# Patient Record
Sex: FEMALE | Race: WHITE | NOT HISPANIC OR LATINO | Employment: FULL TIME | URBAN - METROPOLITAN AREA
[De-identification: names, ages, dates, MRNs, and addresses within clinical notes are randomized per-mention and may not be internally consistent; named-entity substitution may affect disease eponyms.]

---

## 2023-02-09 PROBLEM — R92.8 ABNORMAL FINDING ON MAMMOGRAPHY: Status: ACTIVE | Noted: 2023-02-09

## 2023-02-09 PROBLEM — L68.0 HIRSUTISM: Status: ACTIVE | Noted: 2023-02-09

## 2023-02-09 PROBLEM — R92.1 CALCIFICATION OF LEFT BREAST: Status: ACTIVE | Noted: 2023-02-09

## 2023-02-09 PROBLEM — R00.2 PALPITATIONS: Status: ACTIVE | Noted: 2023-02-09

## 2023-02-09 PROBLEM — E78.00 HYPERCHOLESTEROLEMIA: Status: ACTIVE | Noted: 2023-02-09

## 2023-02-09 PROBLEM — L98.9 SKIN LESION: Status: ACTIVE | Noted: 2023-02-09

## 2023-02-09 PROBLEM — M54.50 ACUTE LOW BACK PAIN WITHOUT SCIATICA: Status: ACTIVE | Noted: 2023-02-09

## 2023-02-09 PROBLEM — N64.4 PAIN OF LEFT BREAST: Status: ACTIVE | Noted: 2023-02-09

## 2023-02-09 PROBLEM — M54.9 BACK PAIN: Status: ACTIVE | Noted: 2023-02-09

## 2023-02-09 PROBLEM — M76.62 ACHILLES TENDINITIS OF LEFT LOWER EXTREMITY: Status: ACTIVE | Noted: 2023-02-09

## 2023-02-09 PROBLEM — I10 HTN (HYPERTENSION): Status: ACTIVE | Noted: 2023-02-09

## 2023-02-09 PROBLEM — N60.99 ATYPICAL DUCTAL HYPERPLASIA OF BREAST: Status: ACTIVE | Noted: 2023-02-09

## 2023-02-09 PROBLEM — R26.2 DIFFICULTY WALKING DUE TO ANKLE AND FOOT JOINT: Status: ACTIVE | Noted: 2023-02-09

## 2023-02-09 PROBLEM — H93.19 TINNITUS: Status: ACTIVE | Noted: 2023-02-09

## 2023-02-09 PROBLEM — M62.81 MUSCLE WEAKNESS ON EXAMINATION: Status: ACTIVE | Noted: 2023-02-09

## 2023-02-09 PROBLEM — N91.2 AMENORRHEA: Status: ACTIVE | Noted: 2023-02-09

## 2023-02-09 RX ORDER — TRAZODONE HYDROCHLORIDE 50 MG/1
50 TABLET ORAL NIGHTLY
COMMUNITY
End: 2023-03-23

## 2023-02-09 RX ORDER — CHOLECALCIFEROL (VITAMIN D3) 25 MCG
TABLET ORAL
COMMUNITY
Start: 2021-03-09

## 2023-02-09 RX ORDER — LISINOPRIL 20 MG/1
1 TABLET ORAL DAILY
COMMUNITY
Start: 2015-07-30 | End: 2023-03-23 | Stop reason: SDUPTHER

## 2023-03-22 PROBLEM — M76.62 ACHILLES TENDINITIS OF LEFT LOWER EXTREMITY: Status: RESOLVED | Noted: 2023-02-09 | Resolved: 2023-03-22

## 2023-03-23 ENCOUNTER — OFFICE VISIT (OUTPATIENT)
Dept: PRIMARY CARE | Facility: CLINIC | Age: 58
End: 2023-03-23
Payer: COMMERCIAL

## 2023-03-23 ENCOUNTER — LAB (OUTPATIENT)
Dept: LAB | Facility: LAB | Age: 58
End: 2023-03-23
Payer: COMMERCIAL

## 2023-03-23 ENCOUNTER — APPOINTMENT (OUTPATIENT)
Dept: LAB | Facility: LAB | Age: 58
End: 2023-03-23
Payer: COMMERCIAL

## 2023-03-23 VITALS
BODY MASS INDEX: 31.45 KG/M2 | OXYGEN SATURATION: 98 % | DIASTOLIC BLOOD PRESSURE: 79 MMHG | SYSTOLIC BLOOD PRESSURE: 112 MMHG | HEART RATE: 87 BPM | TEMPERATURE: 97.8 F | WEIGHT: 189 LBS

## 2023-03-23 DIAGNOSIS — R73.09 BLOOD SUGAR INCREASED: ICD-10-CM

## 2023-03-23 DIAGNOSIS — I10 HYPERTENSION, UNSPECIFIED TYPE: ICD-10-CM

## 2023-03-23 DIAGNOSIS — Z12.11 SCREEN FOR COLON CANCER: Primary | ICD-10-CM

## 2023-03-23 DIAGNOSIS — Z12.31 ENCOUNTER FOR SCREENING MAMMOGRAM FOR MALIGNANT NEOPLASM OF BREAST: ICD-10-CM

## 2023-03-23 DIAGNOSIS — E78.00 HYPERCHOLESTEROLEMIA: ICD-10-CM

## 2023-03-23 DIAGNOSIS — N60.99 ATYPICAL DUCTAL HYPERPLASIA OF BREAST: ICD-10-CM

## 2023-03-23 DIAGNOSIS — R00.2 PALPITATIONS: ICD-10-CM

## 2023-03-23 PROBLEM — R92.1 CALCIFICATION OF LEFT BREAST: Status: RESOLVED | Noted: 2023-02-09 | Resolved: 2023-03-23

## 2023-03-23 PROBLEM — N64.4 PAIN OF LEFT BREAST: Status: RESOLVED | Noted: 2023-02-09 | Resolved: 2023-03-23

## 2023-03-23 PROBLEM — N91.2 AMENORRHEA: Status: RESOLVED | Noted: 2023-02-09 | Resolved: 2023-03-23

## 2023-03-23 PROBLEM — Z00.00 HEALTH CARE MAINTENANCE: Status: ACTIVE | Noted: 2023-03-23

## 2023-03-23 PROBLEM — R92.8 ABNORMAL FINDING ON MAMMOGRAPHY: Status: RESOLVED | Noted: 2023-02-09 | Resolved: 2023-03-23

## 2023-03-23 PROBLEM — M54.50 ACUTE LOW BACK PAIN WITHOUT SCIATICA: Status: RESOLVED | Noted: 2023-02-09 | Resolved: 2023-03-23

## 2023-03-23 LAB
ESTIMATED AVERAGE GLUCOSE FOR HBA1C: 111 MG/DL
HEMOGLOBIN A1C/HEMOGLOBIN TOTAL IN BLOOD: 5.5 %

## 2023-03-23 PROCEDURE — 83036 HEMOGLOBIN GLYCOSYLATED A1C: CPT

## 2023-03-23 PROCEDURE — 3078F DIAST BP <80 MM HG: CPT | Performed by: INTERNAL MEDICINE

## 2023-03-23 PROCEDURE — 36415 COLL VENOUS BLD VENIPUNCTURE: CPT

## 2023-03-23 PROCEDURE — 3074F SYST BP LT 130 MM HG: CPT | Performed by: INTERNAL MEDICINE

## 2023-03-23 PROCEDURE — 90715 TDAP VACCINE 7 YRS/> IM: CPT | Performed by: INTERNAL MEDICINE

## 2023-03-23 PROCEDURE — 90471 IMMUNIZATION ADMIN: CPT | Performed by: INTERNAL MEDICINE

## 2023-03-23 PROCEDURE — 99214 OFFICE O/P EST MOD 30 MIN: CPT | Performed by: INTERNAL MEDICINE

## 2023-03-23 RX ORDER — LISINOPRIL 20 MG/1
20 TABLET ORAL DAILY
Qty: 90 TABLET | Refills: 2 | Status: SHIPPED | OUTPATIENT
Start: 2023-03-23 | End: 2023-03-23

## 2023-03-23 RX ORDER — CLOBETASOL PROPIONATE 0.5 MG/G
OINTMENT TOPICAL
COMMUNITY
Start: 2022-08-10 | End: 2023-03-23 | Stop reason: WASHOUT

## 2023-03-23 NOTE — PATIENT INSTRUCTIONS
It was a pleasure to meet you today!  Our plans:   - Schedule colonoscopy, mammogram and pap smear.

## 2023-03-23 NOTE — PROGRESS NOTES
Subjective   Onelia Mejía is a 58 y.o. female who presents for Establish Care.  HPI    58F pt of Dr. Garcia here for establishment of care, last seen in November for preventive care visit.     PMHx:  - HTN on lisinopril 20mg, does not check home BP readings.   - Dyslipidemia with strong family history of CAD, history of CAC of 0    - Palpitations resolved   - 2018 fall head trauma concussion entirely recovered   - Atypical ductal hyperplasia recommended high risk breast clinic recommended consideration for hormonal treatment, family history of breast cancer due for followup     Social:   -  is ortho Yariel Mejía at    - 2 children who got  last year  4 total children 1 26 year old and 3 24 year olds all out of the house.   - No tobacco, alcohol or drugs   - [facility]     Lifestyle   - Diet - tries to eat well but not the best eater, enjoys soda however, but does go on binges where she will have up to 3-4 cans a day.  - Exercise - either treadmill or rowing and cardio every day, working back up. History of tear of achilles tendon tear and multiple stressors now getting back into it. Walks the dog 1-2 miles day.     Review as systems as indicated in HPI, otherwise unremarkable.     Objective     /79   Pulse 87   Temp 36.6 °C (97.8 °F)   Wt 85.7 kg (189 lb)   SpO2 98%   BMI 31.45 kg/m²      Physical Exam  General: Appears comfortable, NAD, appropriate affect  HEENT: NCAT, EOMI, pupils symmetric, no conjunctival erythema   Neck: Supple, no LAD   Heart: RRR S1 S2 no murmurs appreciated   Lungs: CTA bilaterally, no rhonchi, rales, or wheezes   Abdomen: Soft, NT/ND, no rebound or guarding, NABS   Extremities: no cyanosis or edema appreciated  Neuro: AAO x 3, answers questions appropriately, no FND, gait unremarkable    Problem List Items Addressed This Visit          Circulatory    HTN (hypertension)     On lisinopril 20mg blood pressures are well controlled.          Relevant Medications     lisinopril 20 mg tablet    Palpitations     Resolved, no current concerns             Other    Atypical ductal hyperplasia of breast    Hypercholesterolemia     CAC 0 in 2021 with significant family history interested in further risk stratification will obtain Lp(a).           Other Visit Diagnoses       Screen for colon cancer    -  Primary    Relevant Orders    Colonoscopy    Encounter for screening mammogram for malignant neoplasm of breast        Relevant Orders    BI mammo bilateral screening tomosynthesis    Blood sugar increased        Relevant Orders    Hemoglobin A1C (Completed)

## 2023-03-23 NOTE — ASSESSMENT & PLAN NOTE
CAC 0 in 2021 with significant family history interested in further risk stratification will obtain Lp(a).

## 2023-03-24 NOTE — ASSESSMENT & PLAN NOTE
Colonoscopy due - patient states that she will schedule this, will attempt with clenpiq   Mammogram  due - she will also schedule with high risk breast clinic  Pap smear due will schedule with GYN   Immunizations deferred for now

## 2023-10-25 ENCOUNTER — PHARMACY VISIT (OUTPATIENT)
Dept: PHARMACY | Facility: CLINIC | Age: 58
End: 2023-10-25
Payer: MEDICARE

## 2023-10-31 ENCOUNTER — PHARMACY VISIT (OUTPATIENT)
Dept: PHARMACY | Facility: CLINIC | Age: 58
End: 2023-10-31
Payer: MEDICARE

## 2023-11-04 ENCOUNTER — PHARMACY VISIT (OUTPATIENT)
Dept: PHARMACY | Facility: CLINIC | Age: 58
End: 2023-11-04
Payer: MEDICARE

## 2023-11-06 ENCOUNTER — PHARMACY VISIT (OUTPATIENT)
Dept: PHARMACY | Facility: CLINIC | Age: 58
End: 2023-11-06
Payer: MEDICARE

## 2023-11-06 PROCEDURE — RXMED WILLOW AMBULATORY MEDICATION CHARGE

## 2023-11-14 ENCOUNTER — OFFICE VISIT (OUTPATIENT)
Dept: PRIMARY CARE | Facility: CLINIC | Age: 58
End: 2023-11-14
Payer: COMMERCIAL

## 2023-11-14 ENCOUNTER — LAB (OUTPATIENT)
Dept: LAB | Facility: LAB | Age: 58
End: 2023-11-14
Payer: COMMERCIAL

## 2023-11-14 VITALS
HEART RATE: 81 BPM | SYSTOLIC BLOOD PRESSURE: 114 MMHG | WEIGHT: 188 LBS | TEMPERATURE: 98.7 F | BODY MASS INDEX: 31.28 KG/M2 | DIASTOLIC BLOOD PRESSURE: 74 MMHG

## 2023-11-14 DIAGNOSIS — E78.00 HYPERCHOLESTEROLEMIA: ICD-10-CM

## 2023-11-14 DIAGNOSIS — N60.99 ATYPICAL DUCTAL HYPERPLASIA OF BREAST: ICD-10-CM

## 2023-11-14 DIAGNOSIS — M79.602 LEFT ARM PAIN: ICD-10-CM

## 2023-11-14 DIAGNOSIS — Z00.00 ENCOUNTER FOR PREVENTATIVE ADULT HEALTH CARE EXAMINATION: ICD-10-CM

## 2023-11-14 DIAGNOSIS — I10 HYPERTENSION, UNSPECIFIED TYPE: ICD-10-CM

## 2023-11-14 DIAGNOSIS — Z00.00 ENCOUNTER FOR PREVENTATIVE ADULT HEALTH CARE EXAMINATION: Primary | ICD-10-CM

## 2023-11-14 DIAGNOSIS — R06.83 SNORING: ICD-10-CM

## 2023-11-14 DIAGNOSIS — Z12.83 SKIN CANCER SCREENING: ICD-10-CM

## 2023-11-14 PROBLEM — M62.81 MUSCLE WEAKNESS ON EXAMINATION: Status: RESOLVED | Noted: 2023-02-09 | Resolved: 2023-11-14

## 2023-11-14 PROCEDURE — 80061 LIPID PANEL: CPT

## 2023-11-14 PROCEDURE — 36415 COLL VENOUS BLD VENIPUNCTURE: CPT

## 2023-11-14 PROCEDURE — 85025 COMPLETE CBC W/AUTO DIFF WBC: CPT

## 2023-11-14 PROCEDURE — 3074F SYST BP LT 130 MM HG: CPT | Performed by: INTERNAL MEDICINE

## 2023-11-14 PROCEDURE — 99396 PREV VISIT EST AGE 40-64: CPT | Performed by: INTERNAL MEDICINE

## 2023-11-14 PROCEDURE — 84443 ASSAY THYROID STIM HORMONE: CPT

## 2023-11-14 PROCEDURE — 80053 COMPREHEN METABOLIC PANEL: CPT

## 2023-11-14 PROCEDURE — 1036F TOBACCO NON-USER: CPT | Performed by: INTERNAL MEDICINE

## 2023-11-14 PROCEDURE — 3078F DIAST BP <80 MM HG: CPT | Performed by: INTERNAL MEDICINE

## 2023-11-14 PROCEDURE — 83036 HEMOGLOBIN GLYCOSYLATED A1C: CPT

## 2023-11-14 PROCEDURE — 82172 ASSAY OF APOLIPOPROTEIN: CPT

## 2023-11-14 NOTE — PROGRESS NOTES
I have scale okay so Subjective   Patient ID: Onelia Mejía is a 58 y.o. female who presents for Annual Exam.  HPI    58-year-old female here for preventive care, last seen for establishment of care in March.    -Left arm pain for the last 2 weeks, throbbing and constant, since COVID vaccination in that arm. Denies swelling, no pain on palpation slightly better on movement. Soreness when laying on it.     PMHx:  - HTN on lisinopril 20mg does not measure home blood pressure readings.   - Dyslipidemia - with strong family history of CAD, history of CAC of 0 in 2021, ordered LP(a)  - Palpitations resolved   - 2018 fall head trauma concussion entirely recovered   - Atypical ductal hyperplasia recommended high risk breast clinic recommended consideration for hormonal treatment, family history of breast cancer due for followup      Social:   -  is ortho [facility]ariel Me[facility] (recent [facility])  - 2 children who got  last year  4 total children 1 26 year old and 3 24 year olds all out of the house.   - No tobacco, alcohol or drugs   - [facility]     Lifestyle   - Diet - fine but  intermittently eats unhealthy food. Does eat a lot of red meat and drinks pepsi.   - Exercise - 15 minutes of cardio a day   - Sleep - overall ok, 7 hours gets up to go the bathroom once a night sometimes twice. Does drink a lot of water before bed.  notes that she started to snore.     Review of systems  General: No constitutional symptoms, some weight loss   HEENT: No headaches, changes in vision or hearing, no sinus or dental issues   Cardiac: No chest pain, palpitations, dyspnea on exertion   Lungs: No cough, wheezing, shortness of breath   Abdomen: No abdominal pain, nausea/vomiting, diarrhea or constipation   : No urinary complaints   Musculoskeletal: As described  Heme: No bleeding or thrombosis issues   Lymph: No swollen lymph glands   Skin: No rashes or lesions   Psych: No reported anxiety or depression      Current  Outpatient Medications   Medication Instructions    cholecalciferol (Vitamin D-3) 25 MCG (1000 UT) tablet oral    lisinopril 20 mg tablet TAKE 1 TABLET BY MOUTH ONCE DAILY    mv-min/iron/folic/calcium/vitK (WOMEN'S MULTIVITAMIN ORAL) oral        Objective     /74   Pulse 81   Temp 37.1 °C (98.7 °F) (Temporal)   Wt 85.3 kg (188 lb)   BMI 31.28 kg/m²     Physical Exam  General: Appears comfortable, NAD, appropriate affect  HEENT: NCAT, EOMI, pupils symmetric, no conjunctival erythema   Neck: Supple, no LAD   Heart: RRR S1 S2 no murmurs appreciated   Lungs: CTA bilaterally, no rhonchi, rales, or wheezes   Abdomen: Soft, NT/ND, no rebound or guarding, NABS   Extremities: no cyanosis or edema appreciated  Musk: Left upper extremity no reproducible pain on palpation, no lymphadenopathy  Neuro: AAO x 3, answers questions appropriately, no FND, gait unremarkable    Assessment/Plan   Problem List Items Addressed This Visit      Adult health examination  Age-appropriate screening performed  Depression screen negative  No additional pertinent family history or toxic habits  Declines STI screening   Cancer screening:  -Colonoscopy ordered not yet obtained  -Mammogram ordered not yet obtained  -Pap smear recommended not yet obtained  -Skin -interested in Derm referral for full skin exam  Immunizations: Shingrix deferred otherwise up-to-date  Dental UTD due for visual exam  Discussed adequate vitamin D intake     Atypical ductal hyperplasia of breast     Encouraged follow-up with breast clinic due for mammogram encouraged to schedule.         Hypercholesterolemia     CAC 0 in 2021 with significant family history interested in further risk stratification will obtain Lp(a).          Relevant Orders    Lipoprotein A (LPA)    HTN (hypertension)     On lisinopril 20mg blood pressures are well controlled.           Other Visit Diagnoses       Encounter for preventative adult health care examination    -  Primary    Relevant  Orders    CBC and Auto Differential    Comprehensive Metabolic Panel    Hemoglobin A1C    Lipid Panel    TSH with reflex to Free T4 if abnormal    Follow Up In [facility] - PCP - Health Maintenance    Snoring        With possible concern for sleep apnea interested in home sleep study has difficulty sleeping inhotel rooms    Relevant Orders    Home sleep apnea test (HSAT)    Skin cancer screening        Relevant Orders    Referral to Dermatology    Left arm pain        No findings appreciated on examination, post-COVID vaccination.  Advise supportive measures notify me if fails to improve or worsen.        Follow-up 1 year or as needed

## 2023-11-14 NOTE — PATIENT INSTRUCTIONS
Onelia,   I have ordered blood and/or urine tests for you to do today. The lab can be found on this floor (2nd floor) next to the pharmacy across from the elevators.    Mammogram - Call [phone] to have this scheduled.   Colonoscopy is ordered - please call [phone] to have it scheduled. Otherwise, someone should be reaching out to you.   Gynecology referral - I recommend Dr. Nimo Hayes ([phone]), Dr. Chichi Jalloh ([phone])  or Dr. Ashtyn Rapp ([phone]).   Take 5922-7388 units of Vitamin D3 daily.   Dermatology Referral - I recommend Dr. Mimi Dominguez, Dr. Darnell Santa, Dr. Curt Alejo and Dr. Jj Pina.  You can call [phone] to have this scheduled.   Follow up breast surgery.  Get your second shingrix vaccine.   Home sleep study has been ordered.   Followup 1 year or as needed.

## 2023-11-15 LAB
ALBUMIN SERPL BCP-MCNC: 4.4 G/DL (ref 3.4–5)
ALP SERPL-CCNC: 101 U/L (ref 33–110)
ALT SERPL W P-5'-P-CCNC: 26 U/L (ref 7–45)
ANION GAP SERPL CALC-SCNC: 14 MMOL/L (ref 10–20)
AST SERPL W P-5'-P-CCNC: 20 U/L (ref 9–39)
BASOPHILS # BLD AUTO: 0.03 X10*3/UL (ref 0–0.1)
BASOPHILS NFR BLD AUTO: 0.5 %
BILIRUB SERPL-MCNC: 0.9 MG/DL (ref 0–1.2)
BUN SERPL-MCNC: 10 MG/DL (ref 6–23)
CALCIUM SERPL-MCNC: 9.5 MG/DL (ref 8.6–10.6)
CHLORIDE SERPL-SCNC: 104 MMOL/L (ref 98–107)
CHOLEST SERPL-MCNC: 209 MG/DL (ref 0–199)
CHOLESTEROL/HDL RATIO: 4
CO2 SERPL-SCNC: 28 MMOL/L (ref 21–32)
CREAT SERPL-MCNC: 0.72 MG/DL (ref 0.5–1.05)
EOSINOPHIL # BLD AUTO: 0.06 X10*3/UL (ref 0–0.7)
EOSINOPHIL NFR BLD AUTO: 0.9 %
ERYTHROCYTE [DISTWIDTH] IN BLOOD BY AUTOMATED COUNT: 13.4 % (ref 11.5–14.5)
EST. AVERAGE GLUCOSE BLD GHB EST-MCNC: 108 MG/DL
GFR SERPL CREATININE-BSD FRML MDRD: >90 ML/MIN/1.73M*2
GLUCOSE SERPL-MCNC: 77 MG/DL (ref 74–99)
HBA1C MFR BLD: 5.4 %
HCT VFR BLD AUTO: 44 % (ref 36–46)
HDLC SERPL-MCNC: 52.7 MG/DL
HGB BLD-MCNC: 14.2 G/DL (ref 12–16)
IMM GRANULOCYTES # BLD AUTO: 0.01 X10*3/UL (ref 0–0.7)
IMM GRANULOCYTES NFR BLD AUTO: 0.2 % (ref 0–0.9)
LDLC SERPL CALC-MCNC: 137 MG/DL
LYMPHOCYTES # BLD AUTO: 1.83 X10*3/UL (ref 1.2–4.8)
LYMPHOCYTES NFR BLD AUTO: 28.4 %
MCH RBC QN AUTO: 29.1 PG (ref 26–34)
MCHC RBC AUTO-ENTMCNC: 32.3 G/DL (ref 32–36)
MCV RBC AUTO: 90 FL (ref 80–100)
MONOCYTES # BLD AUTO: 0.38 X10*3/UL (ref 0.1–1)
MONOCYTES NFR BLD AUTO: 5.9 %
NEUTROPHILS # BLD AUTO: 4.14 X10*3/UL (ref 1.2–7.7)
NEUTROPHILS NFR BLD AUTO: 64.1 %
NON HDL CHOLESTEROL: 156 MG/DL (ref 0–149)
NRBC BLD-RTO: 0 /100 WBCS (ref 0–0)
PLATELET # BLD AUTO: 471 X10*3/UL (ref 150–450)
POTASSIUM SERPL-SCNC: 4.7 MMOL/L (ref 3.5–5.3)
PROT SERPL-MCNC: 7.2 G/DL (ref 6.4–8.2)
RBC # BLD AUTO: 4.88 X10*6/UL (ref 4–5.2)
SODIUM SERPL-SCNC: 141 MMOL/L (ref 136–145)
TRIGL SERPL-MCNC: 99 MG/DL (ref 0–149)
TSH SERPL-ACNC: 1.23 MIU/L (ref 0.44–3.98)
VLDL: 20 MG/DL (ref 0–40)
WBC # BLD AUTO: 6.5 X10*3/UL (ref 4.4–11.3)

## 2023-11-17 LAB — LPA SERPL-MCNC: 102 MG/DL

## 2023-11-29 DIAGNOSIS — I10 HYPERTENSION, UNSPECIFIED TYPE: ICD-10-CM

## 2023-11-29 RX ORDER — LISINOPRIL 20 MG/1
20 TABLET ORAL DAILY
Qty: 90 TABLET | Refills: 3 | Status: SHIPPED | OUTPATIENT
Start: 2023-11-29 | End: 2024-11-28

## 2024-02-05 PROCEDURE — RXMED WILLOW AMBULATORY MEDICATION CHARGE

## 2024-02-06 ENCOUNTER — PHARMACY VISIT (OUTPATIENT)
Dept: PHARMACY | Facility: CLINIC | Age: 59
End: 2024-02-06
Payer: MEDICARE

## 2024-02-21 DIAGNOSIS — Z12.11 COLON CANCER SCREENING: ICD-10-CM

## 2024-02-21 RX ORDER — SOD SULF/POT CHLORIDE/MAG SULF 1.479 G
TABLET ORAL
Qty: 24 TABLET | Refills: 0 | Status: SHIPPED | OUTPATIENT
Start: 2024-02-21

## 2024-02-22 ENCOUNTER — HOSPITAL ENCOUNTER (OUTPATIENT)
Dept: RADIOLOGY | Facility: CLINIC | Age: 59
Discharge: HOME | End: 2024-02-22
Payer: COMMERCIAL

## 2024-02-22 VITALS — HEIGHT: 65 IN | WEIGHT: 188.05 LBS | BODY MASS INDEX: 31.33 KG/M2

## 2024-02-22 DIAGNOSIS — Z12.31 ENCOUNTER FOR SCREENING MAMMOGRAM FOR MALIGNANT NEOPLASM OF BREAST: ICD-10-CM

## 2024-02-22 PROBLEM — N60.99 ATYPICAL DUCTAL HYPERPLASIA, BREAST: Status: ACTIVE | Noted: 2024-02-22

## 2024-02-22 PROCEDURE — 77067 SCR MAMMO BI INCL CAD: CPT | Performed by: RADIOLOGY

## 2024-02-22 PROCEDURE — 77067 SCR MAMMO BI INCL CAD: CPT

## 2024-02-22 PROCEDURE — 77063 BREAST TOMOSYNTHESIS BI: CPT | Performed by: RADIOLOGY

## 2024-03-13 ENCOUNTER — TELEPHONE (OUTPATIENT)
Dept: GASTROENTEROLOGY | Facility: CLINIC | Age: 59
End: 2024-03-13
Payer: COMMERCIAL

## 2024-03-13 DIAGNOSIS — Z12.11 COLON CANCER SCREENING: Primary | ICD-10-CM

## 2024-03-13 RX ORDER — SODIUM PICOSULFATE, MAGNESIUM OXIDE, AND ANHYDROUS CITRIC ACID 12; 3.5; 1 G/175ML; G/175ML; MG/175ML
LIQUID ORAL
Qty: 175 ML | Refills: 0 | Status: SHIPPED | OUTPATIENT
Start: 2024-03-13

## 2024-04-29 ENCOUNTER — OFFICE VISIT (OUTPATIENT)
Dept: GASTROENTEROLOGY | Facility: EXTERNAL LOCATION | Age: 59
End: 2024-04-29
Payer: COMMERCIAL

## 2024-04-29 DIAGNOSIS — D12.4 BENIGN NEOPLASM OF DESCENDING COLON: ICD-10-CM

## 2024-04-29 DIAGNOSIS — Z12.11 SCREEN FOR COLON CANCER: ICD-10-CM

## 2024-04-29 DIAGNOSIS — Z80.0 FAMILY HISTORY OF COLON CANCER: Primary | ICD-10-CM

## 2024-04-29 DIAGNOSIS — K64.4 EXTERNAL HEMORRHOIDS: ICD-10-CM

## 2024-04-29 PROCEDURE — 88305 TISSUE EXAM BY PATHOLOGIST: CPT

## 2024-04-29 PROCEDURE — 45385 COLONOSCOPY W/LESION REMOVAL: CPT | Performed by: INTERNAL MEDICINE

## 2024-04-29 PROCEDURE — 88305 TISSUE EXAM BY PATHOLOGIST: CPT | Performed by: PATHOLOGY

## 2024-04-29 NOTE — PROGRESS NOTES
Colonoscopy performed today 4/29/2024 at the [facility] ([facility] [place]  See procedure report(s) under Media tab.

## 2024-04-30 ENCOUNTER — LAB REQUISITION (OUTPATIENT)
Dept: LAB | Facility: HOSPITAL | Age: 59
End: 2024-04-30
Payer: COMMERCIAL

## 2024-04-30 PROCEDURE — RXMED WILLOW AMBULATORY MEDICATION CHARGE

## 2024-05-03 ENCOUNTER — PHARMACY VISIT (OUTPATIENT)
Dept: PHARMACY | Facility: CLINIC | Age: 59
End: 2024-05-03
Payer: MEDICARE

## 2024-05-03 LAB
LABORATORY COMMENT REPORT: NORMAL
PATH REPORT.FINAL DX SPEC: NORMAL
PATH REPORT.GROSS SPEC: NORMAL
PATH REPORT.RELEVANT HX SPEC: NORMAL
PATH REPORT.TOTAL CANCER: NORMAL

## 2024-06-14 ENCOUNTER — APPOINTMENT (OUTPATIENT)
Dept: OBSTETRICS AND GYNECOLOGY | Facility: CLINIC | Age: 59
End: 2024-06-14
Payer: COMMERCIAL

## 2024-07-25 ENCOUNTER — LAB (OUTPATIENT)
Dept: LAB | Facility: LAB | Age: 59
End: 2024-07-25
Payer: COMMERCIAL

## 2024-07-25 ENCOUNTER — APPOINTMENT (OUTPATIENT)
Dept: OBSTETRICS AND GYNECOLOGY | Facility: CLINIC | Age: 59
End: 2024-07-25
Payer: COMMERCIAL

## 2024-07-25 ENCOUNTER — OFFICE VISIT (OUTPATIENT)
Dept: OBSTETRICS AND GYNECOLOGY | Facility: CLINIC | Age: 59
End: 2024-07-25
Payer: COMMERCIAL

## 2024-07-25 VITALS
DIASTOLIC BLOOD PRESSURE: 88 MMHG | SYSTOLIC BLOOD PRESSURE: 132 MMHG | WEIGHT: 188 LBS | HEIGHT: 65 IN | BODY MASS INDEX: 31.32 KG/M2

## 2024-07-25 DIAGNOSIS — N91.2 AMENORRHEA: Primary | ICD-10-CM

## 2024-07-25 DIAGNOSIS — N91.2 AMENORRHEA: ICD-10-CM

## 2024-07-25 DIAGNOSIS — Z01.419 ENCOUNTER FOR GYNECOLOGICAL EXAMINATION WITHOUT ABNORMAL FINDING: ICD-10-CM

## 2024-07-25 LAB
FSH SERPL-ACNC: 80.5 IU/L
LH SERPL-ACNC: 46.6 IU/L

## 2024-07-25 PROCEDURE — 88175 CYTOPATH C/V AUTO FLUID REDO: CPT

## 2024-07-25 PROCEDURE — 87624 HPV HI-RISK TYP POOLED RSLT: CPT

## 2024-07-25 PROCEDURE — 36415 COLL VENOUS BLD VENIPUNCTURE: CPT

## 2024-07-25 PROCEDURE — 83001 ASSAY OF GONADOTROPIN (FSH): CPT

## 2024-07-25 PROCEDURE — 83002 ASSAY OF GONADOTROPIN (LH): CPT

## 2024-07-25 NOTE — PROGRESS NOTES
Subjective   Onelia Mejía is a 59 y.o. female here for a routine exam.  She is a new patient to this practice.  She denies abnormal Pap smears in the past, the last Pap in the chart was 2006 with Dr. Dubose.  It was negative.  No history of STDs.  No surgery.    She has a brother with colon cancer.    She has a Mirena IUD that was placed over 8 years ago.  She is amenorrheic.  No discharge or odor, no pelvic pain.  No dysuria or change in bowel habits.  She is current on her colonoscopy from May 2024.    She does note menopausal symptoms but they are becoming more mild.  Personal health questionnaire reviewed: yes.     Gynecologic History  No LMP recorded. Patient has had an implant.  Contraception: IUD  Last Pap: 2006. Results were: normal  Last mammogram: 25. Results were: normal    Obstetric History  OB History    Para Term  AB Living   4 4 1 3 0 4   SAB IAB Ectopic Multiple Live Births   0 0 0 3 4      # Outcome Date GA Lbr Kimo/2nd Weight Sex Type Anes PTL Lv   4             3             2             1 Term                Objective   Constitutional: Alert and in no acute distress. Well developed, well nourished.   Head and Face: Head and face: Normal.    Eyes: Normal external exam - nonicteric sclera, extraocular movements intact (EOMI) and no ptosis.   Neck: No neck asymmetry. Supple. Thyroid not enlarged and there were no palpable thyroid nodules.    Pulmonary: No respiratory distress.   Chest: Breasts: Normal appearance, no nipple discharge and no skin changes. Palpation of breasts and axillae: No palpable mass and no axillary lymphadenopathy.   Abdomen: Soft nontender; no abdominal mass palpated. No organomegaly. No hernias.   Genitourinary: External genitalia: Normal. No inguinal lymphadenopathy. Bartholin's Urethral and Skenes Glands: Normal. Urethra: Normal.  Bladder: Normal on palpation. Vagina: Normal. Cervix: Normal. IUD strings are visible.   Uterus: Normal.  Right Adnexa/parametria: Normal.  Left Adnexa/parametria: Normal.  Inspection of Perianal Area: Normal.   Musculoskeletal: No joint swelling seen, normal movements of all extremities.   Skin: Normal skin color and pigmentation, normal skin turgor, and no rash.   Neurologic: Non-focal. Grossly intact.   Psychiatric: Alert and oriented x 3. Affect normal to patient baseline. Mood: Appropriate.  Physical Exam     Assessment/Plan   Healthy female exam.  This is a 59-year-old female with a normal exam.  A Pap smear was sent.    Her routine mammogram was ordered with tomosynthesis, it is due in 2025.    We discussed the  Mirena IUD.  I recommend checking an FSH which I suspect will be in the menopausal range.  She will then return for removal of Mirena IUD.  I will see her soon.  Mammogram ordered.

## 2024-07-29 PROCEDURE — RXMED WILLOW AMBULATORY MEDICATION CHARGE

## 2024-07-30 ENCOUNTER — PHARMACY VISIT (OUTPATIENT)
Dept: PHARMACY | Facility: CLINIC | Age: 59
End: 2024-07-30
Payer: MEDICARE

## 2024-08-02 LAB
CYTOLOGY CMNT CVX/VAG CYTO-IMP: NORMAL
HPV HR 12 DNA GENITAL QL NAA+PROBE: NEGATIVE
HPV HR GENOTYPES PNL CVX NAA+PROBE: NEGATIVE
HPV16 DNA SPEC QL NAA+PROBE: NEGATIVE
HPV18 DNA SPEC QL NAA+PROBE: NEGATIVE
LAB AP CONTRACEPTIVE HISTORY: NORMAL
LAB AP HPV GENOTYPE QUESTION: YES
LAB AP HPV HR: NORMAL
LABORATORY COMMENT REPORT: NORMAL
PATH REPORT.TOTAL CANCER: NORMAL

## 2024-09-04 ASSESSMENT — DERMATOLOGY QUALITY OF LIFE (QOL) ASSESSMENT
RATE HOW BOTHERED YOU ARE BY EFFECTS OF YOUR SKIN PROBLEMS ON YOUR ACTIVITIES (EG, GOING OUT, ACCOMPLISHING WHAT YOU WANT, WORK ACTIVITIES OR YOUR RELATIONSHIPS WITH OTHERS): 0 - NEVER BOTHERED
RATE HOW EMOTIONALLY BOTHERED YOU ARE BY YOUR SKIN PROBLEM (FOR EXAMPLE, WORRY, EMBARRASSMENT, FRUSTRATION): 0 - NEVER BOTHERED
RATE HOW BOTHERED YOU ARE BY EFFECTS OF YOUR SKIN PROBLEMS ON YOUR ACTIVITIES (EG, GOING OUT, ACCOMPLISHING WHAT YOU WANT, WORK ACTIVITIES OR YOUR RELATIONSHIPS WITH OTHERS): 0 - NEVER BOTHERED
RATE HOW EMOTIONALLY BOTHERED YOU ARE BY YOUR SKIN PROBLEM (FOR EXAMPLE, WORRY, EMBARRASSMENT, FRUSTRATION): 0 - NEVER BOTHERED
RATE HOW BOTHERED YOU ARE BY SYMPTOMS OF YOUR SKIN PROBLEM (EG, ITCHING, STINGING BURNING, HURTING OR SKIN IRRITATION): 0 - NEVER BOTHERED
WHAT SINGLE SKIN CONDITION LISTED BELOW IS THE PATIENT ANSWERING THE QUALITY-OF-LIFE ASSESSMENT QUESTIONS ABOUT: NONE OF THE ABOVE
WHAT SINGLE SKIN CONDITION LISTED BELOW IS THE PATIENT ANSWERING THE QUALITY-OF-LIFE ASSESSMENT QUESTIONS ABOUT: NONE OF THE ABOVE
RATE HOW BOTHERED YOU ARE BY SYMPTOMS OF YOUR SKIN PROBLEM (EG, ITCHING, STINGING BURNING, HURTING OR SKIN IRRITATION): 0 - NEVER BOTHERED

## 2024-09-04 ASSESSMENT — PATIENT GLOBAL ASSESSMENT (PGA): WHAT IS THE PGA: PATIENT GLOBAL ASSESSMENT:  1 - CLEAR

## 2024-09-05 ENCOUNTER — APPOINTMENT (OUTPATIENT)
Dept: DERMATOLOGY | Facility: CLINIC | Age: 59
End: 2024-09-05
Payer: COMMERCIAL

## 2024-09-05 DIAGNOSIS — L57.8 ACTINIC SKIN DAMAGE: ICD-10-CM

## 2024-09-05 DIAGNOSIS — Z12.83 SCREENING EXAM FOR SKIN CANCER: ICD-10-CM

## 2024-09-05 DIAGNOSIS — D18.01 HEMANGIOMA OF SKIN: ICD-10-CM

## 2024-09-05 DIAGNOSIS — D48.5 NEOPLASM OF UNCERTAIN BEHAVIOR OF SKIN: ICD-10-CM

## 2024-09-05 DIAGNOSIS — L82.1 SEBORRHEIC KERATOSIS: ICD-10-CM

## 2024-09-05 DIAGNOSIS — L81.4 LENTIGO: ICD-10-CM

## 2024-09-05 DIAGNOSIS — L57.0 ACTINIC KERATOSIS: ICD-10-CM

## 2024-09-05 DIAGNOSIS — D22.9 MULTIPLE BENIGN NEVI: Primary | ICD-10-CM

## 2024-09-05 PROCEDURE — 17000 DESTRUCT PREMALG LESION: CPT | Performed by: DERMATOLOGY

## 2024-09-05 PROCEDURE — 1036F TOBACCO NON-USER: CPT | Performed by: DERMATOLOGY

## 2024-09-05 PROCEDURE — 11302 SHAVE SKIN LESION 1.1-2.0 CM: CPT | Performed by: DERMATOLOGY

## 2024-09-05 PROCEDURE — 99203 OFFICE O/P NEW LOW 30 MIN: CPT | Performed by: DERMATOLOGY

## 2024-09-05 ASSESSMENT — DERMATOLOGY PATIENT ASSESSMENT
DO YOU USE A TANNING BED: NO
ARE YOU ON BIRTH CONTROL: NO
ARE YOU AN ORGAN TRANSPLANT RECIPIENT: NO
DO YOU HAVE ANY NEW OR CHANGING LESIONS: NO
HAVE YOU HAD OR DO YOU HAVE VASCULAR DISEASE: NO
DO YOU USE SUNSCREEN: OCCASIONALLY
ARE YOU TRYING TO GET PREGNANT: NO
DO YOU HAVE IRREGULAR MENSTRUAL CYCLES: NO

## 2024-09-05 ASSESSMENT — ITCH NUMERIC RATING SCALE: HOW SEVERE IS YOUR ITCHING?: 0

## 2024-09-05 ASSESSMENT — DERMATOLOGY QUALITY OF LIFE (QOL) ASSESSMENT
RATE HOW EMOTIONALLY BOTHERED YOU ARE BY YOUR SKIN PROBLEM (FOR EXAMPLE, WORRY, EMBARRASSMENT, FRUSTRATION): 0 - NEVER BOTHERED
DATE THE QUALITY-OF-LIFE ASSESSMENT WAS COMPLETED: 67088
RATE HOW BOTHERED YOU ARE BY EFFECTS OF YOUR SKIN PROBLEMS ON YOUR ACTIVITIES (EG, GOING OUT, ACCOMPLISHING WHAT YOU WANT, WORK ACTIVITIES OR YOUR RELATIONSHIPS WITH OTHERS): 0 - NEVER BOTHERED
RATE HOW BOTHERED YOU ARE BY SYMPTOMS OF YOUR SKIN PROBLEM (EG, ITCHING, STINGING BURNING, HURTING OR SKIN IRRITATION): 0 - NEVER BOTHERED

## 2024-09-05 ASSESSMENT — PATIENT GLOBAL ASSESSMENT (PGA): PATIENT GLOBAL ASSESSMENT: PATIENT GLOBAL ASSESSMENT:  1 - CLEAR

## 2024-09-05 NOTE — PROGRESS NOTES
Subjective     Onelia Mejía is a 59 y.o. female who presents for the following: Skin Check (H/o Rosacea. No personal or family h/o skin cancers.). Last derm visit 8/10/22 with Dr. Santa for athropod bites, it was a telephone visit. She has also seen Dr. Carol Tejada for rosacea.     Relevant Full Skin Exam History    Prior Full Skin Exam with a dermatologist: >10 years ago with outside derm   Previous hx of melanoma:   no   Hx of abnormal (dysplastic) moles:    no   Hx of sunburn:    yes   Family hx of Melanoma:   no   Immunosuppressive condition: no        Review of Systems:  No other skin or systemic complaints other than what is documented elsewhere in the note.    The following portions of the chart were reviewed this encounter and updated as appropriate:  Tobacco  Allergies  Meds  Problems  Med Hx  Surg Hx         Skin Cancer History  No skin cancer on file.      Specialty Problems          Dermatology Problems    Hirsutism    Skin lesion        Objective   Well appearing patient in no apparent distress; mood and affect are within normal limits.    A full examination was performed including scalp, head, eyes, ears, nose, lips, neck, chest, axillae, abdomen, back, buttocks, bilateral upper extremities, bilateral lower extremities, hands, feet, fingers, toes, fingernails, and toenails. All findings within normal limits unless otherwise noted below.    Assessment/Plan   1. Multiple benign nevi  Brown and tan macules and papules with reassuring findings on dermoscopy    -These lesions have benign, reassuring patterns on dermoscopy  -Recommend continued self observation, and to contact the office if any changes in nevi are noticed    2. Lentigo  Tan macules    -Benign appearing on exam  -Reassurance, recommend observation    3. Seborrheic keratosis  Stuck on, waxy macule(s)/papule(s)/plaque(s) with comedo-like openings and milia like cysts    -Discussed the nature of the diagnosis  -Reassurance,  recommend continued observation    4. Hemangioma of skin  Cherry red papules    -Discussed the nature of the diagnosis  -Reassurance, recommend continued observation    5. Actinic skin damage  Background of photodamage with hyper- and hypo-pigmented macules on the skin    6. Screening exam for skin cancer  As part of a routine Full Skin Exam, a genital examination with the presence of a chaperone was offered. The patient declinedd the exam and declined the chaperone.       Full body skin exam  -No lesions concerning for malignancy on the remainder the skin exam today   - The ugly duckling sign was discussed. Monitor for any skin lesions that are different in color, shape, or size than others on body  -Sun protection was discussed. Recommend SPF 30+, hats with brims, sun protective clothing, and avoiding sun exposure between 10 AM and 2 PM whenever possible  -Recommend regular skin exams or sooner if new or changing lesions       Related Procedures  Follow Up In Dermatology - Established Patient    7. Actinic keratosis  Right Tip of Nose  Erythematous scaly macule(s)    -Discussed nature of diagnosis and treatment options.   -Patient wishes to proceed with Cryotherapy today  -Possible side effects of liquid nitrogen treatment reviewed including formation of blisters, crusting, tenderness, scar, and discoloration which may be permanent.  -Patient advised to return the office for re-evaluation if the treated lesion(s) do not resolve within 4-6 weeks. Patient verbalizes understanding.    Destr of lesion - Right Tip of Nose  Complexity: simple    Destruction method: cryotherapy    Informed consent: discussed and consent obtained    Lesion destroyed using liquid nitrogen: Yes    Outcome: patient tolerated procedure well with no complications    Post-procedure details: wound care instructions given      8. Neoplasm of uncertain behavior of skin  Left Shoulder - Posterior  13x8 mm brown irregular patch with 2 different  shades of brown and irregular broken pigment pattern              Shave removal    Lesion length (cm):  0.8  Lesion width (cm):  1.3  Margin per side (cm):  0.2  Lesion diameter (cm):  1.7  Informed consent: discussed and consent obtained    Timeout: patient name, date of birth, surgical site, and procedure verified    Procedure prep:  Patient was prepped and draped  Anesthesia: the lesion was anesthetized in a standard fashion    Anesthetic:  1% lidocaine w/ epinephrine 1-100,000 local infiltration  Instrument used: DermaBlade    Hemostasis achieved with: aluminum chloride    Outcome: patient tolerated procedure well    Post-procedure details: sterile dressing applied and wound care instructions given    Dressing type: bandage and petrolatum      Staff Communication: Dermatology Local Anesthesia: Lidocaine 0.5% with Epinephrine 1;200,000 - Amount: 3 ml    Specimen 1 - Dermatopathology- DERM LAB  Differential Diagnosis: dysplastic nevus vs melanoma vs lentigo  Check Margins Yes/No?:    Comments:    Dermpath Lab: Routine Histopathology (formalin-fixed tissue)

## 2024-09-05 NOTE — PATIENT INSTRUCTIONS
Actinic Keratosis - right nasal tip    What is an actinic keratosis?  An actinic keratosis (plural: actinic keratoses) is growth on the surface of the skin that usually appears as a red, hard, crusty or scaly bump.     What causes actinic keratoses?  Repeated prolonged sun exposure causes skin damage, especially in fair-skinned persons. Sun-damaged skin becomes dry and wrinkled and may form rough, scaly spots called actinic keratoses. These rough spots remain on the skin even though the crust or scale on top is picked off.     Why treat actinic keratoses?  Actinic keratoses are not skin cancers, but because they may sometimes turn cancerous they are called “pre-cancerous”. Not all will turn to skin cancer, and it usually takes several years for this to happen. Because it is much easier to treat an actinic keratosis then it is to remove a skin cancer, actinic keratoses should be treated to prevent future skin cancer.     How are actinic keratoses treated?  The most common way of treating actinic keratoses is to freeze them with liquid nitrogen. Freezing causes scabbing and shedding of the sun-damaged skin. Healing after a removal usually takes two weeks, depending on the size and location of the keratosis. Hands and legs heal more slowly than the face. The skin’s final appearance is usually excellent. There are several topical medications that can be used to treat actinic keratoses. These medications generally have side effects of redness, crusting, and pain. Some are used for a few days, and some for several months before the actinic keratosis is completely gone. Photodynamic therapy is another alternative to freezing actinic keratoses. This treatment is done in a physician’s office. A medication is applied to the area of skin with actinic keratoses, and it is allowed to soak in for one or more hours. A special light is then applied to the skin. Side effects include redness, burning, and peeling.    How can you  prevent actinic keratoses?  Protection from the sun is the best way to prevent actinic keratoses. The use of proper clothing and sunscreens can prevent the sun damage that leads to an actinic keratosis.  Unfortunately, some sun damage is permanent. Once sun damage has progressed to the point where actinic keratoses develop, new keratoses may appear even without further sun exposure. However, even in skin that is already heavily sun damaged, good sun protection can help reduce the number of actinic keratoses that will appear.

## 2024-09-11 LAB
LAB AP ASR DISCLAIMER: NORMAL
LABORATORY COMMENT REPORT: NORMAL
PATH REPORT.FINAL DX SPEC: NORMAL
PATH REPORT.GROSS SPEC: NORMAL
PATH REPORT.RELEVANT HX SPEC: NORMAL
PATH REPORT.TOTAL CANCER: NORMAL
PATHOLOGY SYNOPTIC REPORT: NORMAL

## 2024-09-12 ENCOUNTER — TELEPHONE (OUTPATIENT)
Dept: DERMATOLOGY | Facility: CLINIC | Age: 59
End: 2024-09-12
Payer: COMMERCIAL

## 2024-09-13 DIAGNOSIS — C43.9 MELANOMA OF SKIN (MULTI): Primary | ICD-10-CM

## 2024-09-13 NOTE — TUMOR BOARD NOTE
General Patient Information  Name:  Onelia Mejía  Evaluation #:  1  Conference Date:  9/16/2024  YOB: 1965  MRN:  [id number]  Program Physician(s):  Shorty Colbert  Referring Physician(s):  Hiren Bear      Summary   Stage:  Ngozi ([id number]) ; Melanoma 5 year survival: 99%    Assessment:  Left posterior shoulder with a non-ulcerated superficial spreading melanoma, Breslow 0.2 mm    Recommendation:  WLE with 1 cm margins.    Review Multidisciplinary Cutaneous Oncology Conference recommendation with patient.  Continue routine follow up and total body skin exams with Mimi Dominguez.    Follow Up:  Hiren Bear      History and Physical Exam  Dermatologic History:   59 y.o. female with a biopsy of the left posterior shoulder on 9/5/2024 showing a non-ulcerated melanoma, superficial spreading type, Breslow: 0.2 mm.    Patient is scheduled with Dr. Cavazos on 10/14/2024    Past Medical History:    Past Medical History:   Diagnosis Date    Atypical ductal hyperplasia, breast     Hypertension     Other conditions influencing health status     No significant past medical history    Scoliosis        Family History of DNS/MM:  Unknown    Skin:  13x8 mm brown irregular patch with 2 different shades of brown and irregular broken pigment pattern     Lymphatic:        Pathology  Dermatopathology- DERM LAB: [id number]  Collected 9/5/2024 13:29      SKIN, LEFT SHOULDER - POSTERIOR, SHAVE EXCISION:  MALIGNANT MELANOMA, BRESLOW THICKNESS 0.2 MM, PRESENT ON THE DEEP AND PERIPHERAL MARGIN, SEE NOTE.     Note: Microscopic examination reveals a specimen that extends into the superficial dermis. There is an asymmetric proliferation of nested and single atypical melanocytes throughout all layers of the epidermis. There are occasional nests of melanocytes in the dermis. The melanocytes stain with antibodies against PRAME and SOX-10. All control slides stain appropriately. The melanocytes have  mildly enlarged nuclei with mild cytoplasm.     ** Electronically signed out by Vishnu Quispe MD **  SPECIMEN   Procedure  Biopsy, shave   Specimen Laterality  Left   TUMOR   Tumor Site  Skin of upper limb and shoulder: Left shoulder - posterior        Histologic Type  Superficial spreading melanoma (low-cumulative sun damage (CSD) melanoma)   Maximum Tumor (Breslow) Thickness (Millimeters)  0.2 mm   Ulceration  Not identified   Anatomic (Jerrod) Level  II (melanoma present in but does not fill and expand papillary dermis)   Mitotic Rate  None identified   Microsatellite(s)  Not identified   Lymphovascular Invasion  Not identified   Neurotropism  Not identified   Tumor-Infiltrating Lymphocytes  Not identified   Tumor Regression  Not identified   MARGINS     Margin Status for Invasive Melanoma  Invasive melanoma present at margin   Margin(s) Involved by Invasive Melanoma  Peripheral     Deep   Margin Status for Melanoma in situ  All margins negative for melanoma in situ   PATHOLOGIC STAGE CLASSIFICATION (pTNM, AJCC 8th Edition)     pT Category  pT1a       Radiology        Clinical Images  9/5/2024

## 2024-09-16 ENCOUNTER — TUMOR BOARD CONFERENCE (OUTPATIENT)
Dept: HEMATOLOGY/ONCOLOGY | Facility: HOSPITAL | Age: 59
End: 2024-09-16
Payer: COMMERCIAL

## 2024-10-07 ENCOUNTER — APPOINTMENT (OUTPATIENT)
Dept: OBSTETRICS AND GYNECOLOGY | Facility: CLINIC | Age: 59
End: 2024-10-07
Payer: COMMERCIAL

## 2024-10-07 VITALS — SYSTOLIC BLOOD PRESSURE: 125 MMHG | WEIGHT: 186 LBS | DIASTOLIC BLOOD PRESSURE: 84 MMHG | BODY MASS INDEX: 30.95 KG/M2

## 2024-10-07 DIAGNOSIS — Z30.432 ENCOUNTER FOR REMOVAL OF INTRAUTERINE CONTRACEPTIVE DEVICE (IUD): Primary | ICD-10-CM

## 2024-10-07 PROCEDURE — 58301 REMOVE INTRAUTERINE DEVICE: CPT | Performed by: OBSTETRICS & GYNECOLOGY

## 2024-10-07 NOTE — PROGRESS NOTES
Patient ID: Onelia Mejía is a 59 y.o. female here for removal of  Mirena IUD.    IUD Removal    Performed by: Nimo Hayes MD  Authorized by: Nimo Hayes MD    Procedure: IUD removal    Consent obtained by patient, parent, or legal power of  - including discussion of procedure risks and benefits, patient questions answered, and patient education provided: yes    Reason for removal: patient request    Strings visualized: yes    Tenaculum applied to cervix: no    IUD grasped by forceps: yes    Performed with ultrasound guidance: no    IUD removed: yes    Date/Time of Removal:  10/7/2024 3:46 PM  Removed without complications: yes    IUD intact: yes    Removal comments: 59-year-old female with amenorrhea, her Mirena IUD was inserted over 8 years ago.  An FSH recently was 80.5.  Her Pap in 2024 was negative, high risk HPV negative.    The strings were visible and the IUD was removed in the typical fashion.  It was intact and this was verified with the patient.  We discussed that she is likely menopausal, but she may want to use condoms until 1 year of amenorrhea without the IUD.  I will see her in 1 year for annual exam.  Cervix manually dilated: no

## 2024-10-14 ENCOUNTER — APPOINTMENT (OUTPATIENT)
Dept: DERMATOLOGY | Facility: CLINIC | Age: 59
End: 2024-10-14
Payer: COMMERCIAL

## 2024-10-14 DIAGNOSIS — C43.62 MELANOMA OF SHOULDER, LEFT (MULTI): ICD-10-CM

## 2024-10-14 PROCEDURE — 12032 INTMD RPR S/A/T/EXT 2.6-7.5: CPT | Performed by: DERMATOLOGY

## 2024-10-14 PROCEDURE — 11604 EXC TR-EXT MAL+MARG 3.1-4 CM: CPT | Performed by: DERMATOLOGY

## 2024-10-14 PROCEDURE — 99214 OFFICE O/P EST MOD 30 MIN: CPT | Performed by: DERMATOLOGY

## 2024-10-14 NOTE — PROGRESS NOTES
Melanoma Excision Consult Note    Date of Surgery: 10/14/2024  Surgeon:  Beverley Cavazos MD  Office Location:  [address] [facility] [place]  [address] [facility]  [address]  [facility] [address]  Dept: [phone]  Dept Fax: [phone]   Referring Provider: Mimi Dominguez MD      Brendan Mejía is a 59 y.o. female who presents for the following: Excision for melanoma.    According to the patient, the lesion has been present for approximately greater than 1 year at the time of diagnosis.  The lesion is not causing symptoms.  The lesion has not been treated previously.    The patient does not have a pacemaker / defibrillator.  The patient does not have a heart valve / joint replacement.  The patient is not on blood thinners.    The following portions of the chart were reviewed this encounter and updated as appropriate:       Review of Systems:  No other skin or systemic complaints other than what is documented elsewhere in the note.    Medical History:  Clinically relevant history including significant past medical history, review of systems, medications and allergies was reviewed and is documented in Epic.    Objective   Well appearing patient in no apparent distress; mood and affect are within normal limits.  Vital signs: See record.  Noted on the Left Shoulder - Posterior  Is a 1.1 x 1.4 cm scar    The patient confirmed the identified site.    Discussion:  The nature of the diagnosis was explained. The lesion is an early melanoma but is likely to have been present for >1 year and is likely to progress without treatment. The multidisciplinary cutaneous oncology tumor board report was reviewed with the patient and surgery is recommended. The patient was informed that based on the depth and lack of ulceration, a sentinel lymph node biopsy is not indicated. However, the melanoma may be upstaged after excision following histopathologic examination, which may require additional treatment.  Warning signs of melanoma were discussed. We recommended that the patient have regular total body skin exams given increased risk of skin cancers. The patient was instructed to use sun protective behaviors including use of broad spectrum sunscreens and sun protective clothing to reduce the risk of skin cancers.     Surgery was recommended and discussed with the patient. The risks, benefits and potential adverse effects were reviewed and the patient voiced understanding. Discussion included but was not limited to the risks of bleeding and infection, likely scar outcome, possible need for revision surgery, cure rate, wound care requirements, activity restrictions and time to heal. Reconstruction options, risks and benefits were reviewed including second intention healing and linear repair (4-1 ratio was explained). It was explained that the scar would be longer than the original lesion.    Medical Decision Making:    Column 1 - chronic illness with progression  Column 2 - category 3: discussion of management with external physicians (multidisciplinary tumor board)  Column 3 - decision regarding minor surgery with identified risk factors (bleeding, infection, scarring)

## 2024-10-17 LAB
LABORATORY COMMENT REPORT: NORMAL
PATH REPORT.FINAL DX SPEC: NORMAL
PATH REPORT.GROSS SPEC: NORMAL
PATH REPORT.MICROSCOPIC SPEC OTHER STN: NORMAL
PATH REPORT.RELEVANT HX SPEC: NORMAL
PATH REPORT.TOTAL CANCER: NORMAL

## 2024-10-18 NOTE — RESULT ENCOUNTER NOTE
Romaine Mejía - your results show the skin cancer has been completely removed. No further treatment required. Continue skin checks every 3-4 months with your primary dermatologist. Please call/message us with any questions/concerns.

## 2024-10-25 ENCOUNTER — OFFICE VISIT (OUTPATIENT)
Dept: DERMATOLOGY | Facility: CLINIC | Age: 59
End: 2024-10-25
Payer: COMMERCIAL

## 2024-10-25 ENCOUNTER — TELEPHONE (OUTPATIENT)
Dept: DERMATOLOGY | Facility: CLINIC | Age: 59
End: 2024-10-25

## 2024-10-25 DIAGNOSIS — L08.9 INFECTION OF SKIN AND SUBCUTANEOUS TISSUE: Primary | ICD-10-CM

## 2024-10-25 PROCEDURE — 1036F TOBACCO NON-USER: CPT | Performed by: NURSE PRACTITIONER

## 2024-10-25 PROCEDURE — 99213 OFFICE O/P EST LOW 20 MIN: CPT | Performed by: NURSE PRACTITIONER

## 2024-10-25 RX ORDER — MUPIROCIN 20 MG/G
OINTMENT TOPICAL
Qty: 22 G | Refills: 0 | Status: SHIPPED | OUTPATIENT
Start: 2024-10-25

## 2024-10-25 RX ORDER — CEPHALEXIN 500 MG/1
CAPSULE ORAL
Qty: 20 CAPSULE | Refills: 0 | Status: SHIPPED | OUTPATIENT
Start: 2024-10-25

## 2024-10-25 ASSESSMENT — DERMATOLOGY QUALITY OF LIFE (QOL) ASSESSMENT
RATE HOW EMOTIONALLY BOTHERED YOU ARE BY YOUR SKIN PROBLEM (FOR EXAMPLE, WORRY, EMBARRASSMENT, FRUSTRATION): 0 - NEVER BOTHERED
WHAT SINGLE SKIN CONDITION LISTED BELOW IS THE PATIENT ANSWERING THE QUALITY-OF-LIFE ASSESSMENT QUESTIONS ABOUT: NONE OF THE ABOVE
RATE HOW BOTHERED YOU ARE BY SYMPTOMS OF YOUR SKIN PROBLEM (EG, ITCHING, STINGING BURNING, HURTING OR SKIN IRRITATION): 6 - ALWAYS BOTHERED
RATE HOW EMOTIONALLY BOTHERED YOU ARE BY YOUR SKIN PROBLEM (FOR EXAMPLE, WORRY, EMBARRASSMENT, FRUSTRATION): 0 - NEVER BOTHERED
RATE HOW BOTHERED YOU ARE BY EFFECTS OF YOUR SKIN PROBLEMS ON YOUR ACTIVITIES (EG, GOING OUT, ACCOMPLISHING WHAT YOU WANT, WORK ACTIVITIES OR YOUR RELATIONSHIPS WITH OTHERS): 0 - NEVER BOTHERED
RATE HOW BOTHERED YOU ARE BY SYMPTOMS OF YOUR SKIN PROBLEM (EG, ITCHING, STINGING BURNING, HURTING OR SKIN IRRITATION): 6 - ALWAYS BOTHERED
WHAT SINGLE SKIN CONDITION LISTED BELOW IS THE PATIENT ANSWERING THE QUALITY-OF-LIFE ASSESSMENT QUESTIONS ABOUT: NONE OF THE ABOVE
RATE HOW BOTHERED YOU ARE BY EFFECTS OF YOUR SKIN PROBLEMS ON YOUR ACTIVITIES (EG, GOING OUT, ACCOMPLISHING WHAT YOU WANT, WORK ACTIVITIES OR YOUR RELATIONSHIPS WITH OTHERS): 0 - NEVER BOTHERED

## 2024-10-25 ASSESSMENT — PATIENT GLOBAL ASSESSMENT (PGA): WHAT IS THE PGA: PATIENT GLOBAL ASSESSMENT:  1 - CLEAR

## 2024-10-25 NOTE — PROGRESS NOTES
Subjective     Onelia Mejía is a 59 y.o. female who presents for the following: Skin Check (Left shoulder).     Review of Systems:  No other skin or systemic complaints other than what is documented elsewhere in the note.    The following portions of the chart were reviewed this encounter and updated as appropriate:          Skin Cancer History  Biopsy Date Type Location Status   9/5/24 Melanoma Left Shoulder - Posterior Patient/Caregiver Informed  10/14/24       Specialty Problems          Dermatology Problems    Hirsutism    Skin lesion        Objective   Well appearing patient in no apparent distress; mood and affect are within normal limits.    A focused skin examination was performed. All findings within normal limits unless otherwise noted below.    Assessment/Plan   1. Infection of skin and subcutaneous tissue  Left Shoulder - Anterior  Linear incision intact with erythema and some induration extending >1 cm from wound edge with some scattered pustules and some honey colored crust in the incision area.     This is a 59 y.o. female here for wound check. Post op day 11. On Tuesday noted slight tenderness (previously no pain) and pink discoloration. Since then the redness has continued to spread and noticed having pustules in the area. Pain has progressively worsened to 2-3/10 at this time. Favor infection based on hx. Will start mupirocin TID for 7-10 days and will start keflex 500 mg QID for 5 days. Side effects for keflex include rash, nausea, diarrhea, abdominal pain. Follow up on Monday as planned for potential suture removal. Giovanni replaced by Marisel EMANUEL.     Related Medications  cephalexin (Keflex) 500 mg capsule  Take one capsule by mouth four times daily for 5 days    mupirocin (Bactroban) 2 % ointment  Apply to affected area of open skin three times daily until healed

## 2024-10-25 NOTE — TELEPHONE ENCOUNTER
Pt called with concerns about her surgical site done by Dr. Cavazos 10/14/24 on her left arm. Noticed on Wednesday increased redness and pain. Denies warm to the touch, drainage or foul odor. Dr. Cavazos advise that she come into the [place] office to see Shorty Nuñez for a wound check. Pt added onto [place] schedule today.

## 2024-10-25 NOTE — TELEPHONE ENCOUNTER
Surgical site: Left Shoulder Posterior  Date of procedure 10/14/24    Patient informed about pathology result which showed clear margins. No further surgery needed but the patient was advised to follow up with general dermatology. The patient had no further concerns and was advised to call with any questions.

## 2024-10-28 ENCOUNTER — APPOINTMENT (OUTPATIENT)
Dept: DERMATOLOGY | Facility: CLINIC | Age: 59
End: 2024-10-28
Payer: COMMERCIAL

## 2024-10-28 DIAGNOSIS — Z51.89 VISIT FOR WOUND CHECK: ICD-10-CM

## 2024-10-28 PROCEDURE — 99024 POSTOP FOLLOW-UP VISIT: CPT | Performed by: DERMATOLOGY

## 2024-10-30 PROCEDURE — RXMED WILLOW AMBULATORY MEDICATION CHARGE

## 2024-11-04 ENCOUNTER — PHARMACY VISIT (OUTPATIENT)
Dept: PHARMACY | Facility: CLINIC | Age: 59
End: 2024-11-04
Payer: MEDICARE

## 2024-11-17 ASSESSMENT — PROMIS GLOBAL HEALTH SCALE
RATE_MENTAL_HEALTH: EXCELLENT
EMOTIONAL_PROBLEMS: NEVER
CARRYOUT_PHYSICAL_ACTIVITIES: COMPLETELY
CARRYOUT_SOCIAL_ACTIVITIES: EXCELLENT
RATE_PHYSICAL_HEALTH: VERY GOOD
RATE_QUALITY_OF_LIFE: EXCELLENT
RATE_SOCIAL_SATISFACTION: EXCELLENT
RATE_GENERAL_HEALTH: VERY GOOD
RATE_AVERAGE_PAIN: 2

## 2024-11-18 PROBLEM — R26.2 DIFFICULTY WALKING DUE TO ANKLE AND FOOT JOINT: Status: RESOLVED | Noted: 2023-02-09 | Resolved: 2024-11-18

## 2024-11-18 PROBLEM — L98.9 SKIN LESION: Status: RESOLVED | Noted: 2023-02-09 | Resolved: 2024-11-18

## 2024-11-18 PROBLEM — M54.9 BACK PAIN: Status: RESOLVED | Noted: 2023-02-09 | Resolved: 2024-11-18

## 2024-11-18 PROBLEM — C43.62 MALIGNANT MELANOMA OF LEFT UPPER EXTREMITY INCLUDING SHOULDER (MULTI): Status: ACTIVE | Noted: 2024-11-18

## 2024-11-19 ENCOUNTER — APPOINTMENT (OUTPATIENT)
Dept: PRIMARY CARE | Facility: CLINIC | Age: 59
End: 2024-11-19
Payer: COMMERCIAL

## 2024-11-19 ENCOUNTER — LAB (OUTPATIENT)
Dept: LAB | Facility: LAB | Age: 59
End: 2024-11-19
Payer: COMMERCIAL

## 2024-11-19 VITALS
DIASTOLIC BLOOD PRESSURE: 77 MMHG | BODY MASS INDEX: 31.41 KG/M2 | HEART RATE: 77 BPM | SYSTOLIC BLOOD PRESSURE: 117 MMHG | HEIGHT: 64 IN | WEIGHT: 184 LBS | OXYGEN SATURATION: 94 %

## 2024-11-19 DIAGNOSIS — Z00.00 ENCOUNTER FOR PREVENTATIVE ADULT HEALTH CARE EXAMINATION: ICD-10-CM

## 2024-11-19 DIAGNOSIS — Z01.84 IMMUNITY STATUS TESTING: ICD-10-CM

## 2024-11-19 DIAGNOSIS — I10 HYPERTENSION, UNSPECIFIED TYPE: ICD-10-CM

## 2024-11-19 DIAGNOSIS — N60.99 ATYPICAL DUCTAL HYPERPLASIA OF BREAST: ICD-10-CM

## 2024-11-19 DIAGNOSIS — E78.00 HYPERCHOLESTEROLEMIA: ICD-10-CM

## 2024-11-19 DIAGNOSIS — Z12.31 SCREENING MAMMOGRAM FOR BREAST CANCER: Primary | ICD-10-CM

## 2024-11-19 DIAGNOSIS — C43.62 MALIGNANT MELANOMA OF LEFT UPPER EXTREMITY INCLUDING SHOULDER (MULTI): ICD-10-CM

## 2024-11-19 DIAGNOSIS — R06.83 SNORING: ICD-10-CM

## 2024-11-19 LAB
ALBUMIN SERPL BCP-MCNC: 4.8 G/DL (ref 3.4–5)
ALP SERPL-CCNC: 109 U/L (ref 33–110)
ALT SERPL W P-5'-P-CCNC: 26 U/L (ref 7–45)
ANION GAP SERPL CALC-SCNC: 14 MMOL/L (ref 10–20)
AST SERPL W P-5'-P-CCNC: 18 U/L (ref 9–39)
BASOPHILS # BLD AUTO: 0.03 X10*3/UL (ref 0–0.1)
BASOPHILS NFR BLD AUTO: 0.4 %
BILIRUB SERPL-MCNC: 0.9 MG/DL (ref 0–1.2)
BUN SERPL-MCNC: 9 MG/DL (ref 6–23)
CALCIUM SERPL-MCNC: 9.8 MG/DL (ref 8.6–10.6)
CHLORIDE SERPL-SCNC: 103 MMOL/L (ref 98–107)
CHOLEST SERPL-MCNC: 206 MG/DL (ref 0–199)
CHOLESTEROL/HDL RATIO: 4.3
CO2 SERPL-SCNC: 28 MMOL/L (ref 21–32)
CREAT SERPL-MCNC: 0.69 MG/DL (ref 0.5–1.05)
EGFRCR SERPLBLD CKD-EPI 2021: >90 ML/MIN/1.73M*2
EOSINOPHIL # BLD AUTO: 0.04 X10*3/UL (ref 0–0.7)
EOSINOPHIL NFR BLD AUTO: 0.5 %
ERYTHROCYTE [DISTWIDTH] IN BLOOD BY AUTOMATED COUNT: 13 % (ref 11.5–14.5)
EST. AVERAGE GLUCOSE BLD GHB EST-MCNC: 117 MG/DL
GLUCOSE SERPL-MCNC: 86 MG/DL (ref 74–99)
HBA1C MFR BLD: 5.7 %
HBV SURFACE AB SER-ACNC: <3.1 MIU/ML
HBV SURFACE AG SERPL QL IA: NONREACTIVE
HCT VFR BLD AUTO: 42.1 % (ref 36–46)
HDLC SERPL-MCNC: 48.4 MG/DL
HGB BLD-MCNC: 13.7 G/DL (ref 12–16)
IMM GRANULOCYTES # BLD AUTO: 0.01 X10*3/UL (ref 0–0.7)
IMM GRANULOCYTES NFR BLD AUTO: 0.1 % (ref 0–0.9)
LDLC SERPL CALC-MCNC: 135 MG/DL
LYMPHOCYTES # BLD AUTO: 2.21 X10*3/UL (ref 1.2–4.8)
LYMPHOCYTES NFR BLD AUTO: 29 %
MCH RBC QN AUTO: 28.7 PG (ref 26–34)
MCHC RBC AUTO-ENTMCNC: 32.5 G/DL (ref 32–36)
MCV RBC AUTO: 88 FL (ref 80–100)
MONOCYTES # BLD AUTO: 0.43 X10*3/UL (ref 0.1–1)
MONOCYTES NFR BLD AUTO: 5.7 %
NEUTROPHILS # BLD AUTO: 4.89 X10*3/UL (ref 1.2–7.7)
NEUTROPHILS NFR BLD AUTO: 64.3 %
NON HDL CHOLESTEROL: 158 MG/DL (ref 0–149)
NRBC BLD-RTO: 0 /100 WBCS (ref 0–0)
PLATELET # BLD AUTO: 434 X10*3/UL (ref 150–450)
POTASSIUM SERPL-SCNC: 4.3 MMOL/L (ref 3.5–5.3)
PROT SERPL-MCNC: 7.7 G/DL (ref 6.4–8.2)
RBC # BLD AUTO: 4.78 X10*6/UL (ref 4–5.2)
SODIUM SERPL-SCNC: 141 MMOL/L (ref 136–145)
TRIGL SERPL-MCNC: 114 MG/DL (ref 0–149)
TSH SERPL-ACNC: 1.54 MIU/L (ref 0.44–3.98)
VLDL: 23 MG/DL (ref 0–40)
WBC # BLD AUTO: 7.6 X10*3/UL (ref 4.4–11.3)

## 2024-11-19 PROCEDURE — 3078F DIAST BP <80 MM HG: CPT | Performed by: INTERNAL MEDICINE

## 2024-11-19 PROCEDURE — 82043 UR ALBUMIN QUANTITATIVE: CPT

## 2024-11-19 PROCEDURE — 3008F BODY MASS INDEX DOCD: CPT | Performed by: INTERNAL MEDICINE

## 2024-11-19 PROCEDURE — 85025 COMPLETE CBC W/AUTO DIFF WBC: CPT

## 2024-11-19 PROCEDURE — 36415 COLL VENOUS BLD VENIPUNCTURE: CPT

## 2024-11-19 PROCEDURE — 80061 LIPID PANEL: CPT

## 2024-11-19 PROCEDURE — 1036F TOBACCO NON-USER: CPT | Performed by: INTERNAL MEDICINE

## 2024-11-19 PROCEDURE — 99396 PREV VISIT EST AGE 40-64: CPT | Performed by: INTERNAL MEDICINE

## 2024-11-19 PROCEDURE — 84443 ASSAY THYROID STIM HORMONE: CPT

## 2024-11-19 PROCEDURE — 3074F SYST BP LT 130 MM HG: CPT | Performed by: INTERNAL MEDICINE

## 2024-11-19 PROCEDURE — 80053 COMPREHEN METABOLIC PANEL: CPT

## 2024-11-19 PROCEDURE — 87340 HEPATITIS B SURFACE AG IA: CPT

## 2024-11-19 PROCEDURE — 82570 ASSAY OF URINE CREATININE: CPT

## 2024-11-19 PROCEDURE — 83036 HEMOGLOBIN GLYCOSYLATED A1C: CPT

## 2024-11-19 PROCEDURE — 86706 HEP B SURFACE ANTIBODY: CPT

## 2024-11-19 RX ORDER — ROSUVASTATIN CALCIUM 5 MG/1
5 TABLET, COATED ORAL NIGHTLY
Qty: 90 TABLET | Refills: 1 | Status: SHIPPED | OUTPATIENT
Start: 2024-11-19 | End: 2025-11-19

## 2024-11-19 ASSESSMENT — PAIN SCALES - GENERAL: PAINLEVEL_OUTOF10: 0-NO PAIN

## 2024-11-19 ASSESSMENT — PATIENT HEALTH QUESTIONNAIRE - PHQ9
SUM OF ALL RESPONSES TO PHQ9 QUESTIONS 1 AND 2: 0
1. LITTLE INTEREST OR PLEASURE IN DOING THINGS: NOT AT ALL
2. FEELING DOWN, DEPRESSED OR HOPELESS: NOT AT ALL

## 2024-11-19 NOTE — ASSESSMENT & PLAN NOTE
CAC 0 in 2021, LPA quite high recommend consideration for medical management  Amenable to initiation of rosuvastatin 5 mg.  Potential side effects and management expectations reviewed including GI disturbance, myalgia and fatigue.  Rare but significant risks include hepatotoxicity, hyperglycemia and myopathy.  Repeat levels in 3 months to see if numbers improve  Orders:    rosuvastatin (Crestor) 5 mg tablet; Take 1 tablet (5 mg) by mouth once daily at bedtime.    Follow Up In [facility] - PCP - Health Maintenance; Future

## 2024-11-19 NOTE — PROGRESS NOTES
Subjective     Patient ID: Onelia Mejía is a 59 y.o. female who presents for Annual Exam.  HPI    59-year-old female here for preventive care visit, last seen last year.      - Snoring -ordered HSAT no response to sleep scheduling and declined.   She was recently diagnosed with malignant melanoma status post removal with margins with Dr. Cavazos, has had adequate follow-up    PMHx:  - HTN - on lisinopril 20mg does not measure home blood pressure readings.   - Dyslipidemia - with strong family history of CAD,  CAC of 0 in 2021, LP(a) high 102 recommended consideration for medical management, last   - Palpitations resolved   - 2018 fall head trauma concussion entirely recovered   - Atypical ductal hyperplasia -  recommended high risk breast clinic recommended consideration for hormonal treatment, family history of breast cancer due for followup   - Melanoma followed by dermatology     Social:   -  [facility] ([facility]), TIA in September and possibly another one this morning.  - 2 children who got  last year  4 total children 1 26 year old and 3 24 year olds all out of the house., 2 grandbabies  - No tobacco, alcohol or drugs   - [facility] former .      Lifestyle   - Diet -switched to mediterranean diet, but does cheat. Still drinks pepsi on the weekends, otherwise water with lemon.   - Exercise - 15 minutes of cardio a day   - Sleep - overall ok, 7 hours gets up to go the bathroom once a night sometimes twice. Does drink a lot of water before bed.  notes that she started to snore.        Objective       Current Outpatient Medications:     lisinopril 20 mg tablet, TAKE 1 TABLET BY MOUTH ONCE DAILY, Disp: 90 tablet, Rfl: 3    cephalexin (Keflex) 500 mg capsule, Take one capsule by mouth four times daily for 5 days, Disp: 20 capsule, Rfl: 0    cholecalciferol (Vitamin D-3) 25 MCG (1000 UT) tablet, Take by mouth., Disp: , Rfl:     mupirocin (Bactroban) 2 % ointment,  "Apply to affected area of open skin three times daily until healed, Disp: 22 g, Rfl: 0    mv-min/iron/folic/calcium/vitK (WOMEN'S MULTIVITAMIN ORAL), Take by mouth., Disp: , Rfl:     rosuvastatin (Crestor) 5 mg tablet, Take 1 tablet (5 mg) by mouth once daily at bedtime., Disp: 90 tablet, Rfl: 1      /77   Pulse 77   Ht 1.635 m (5' 4.37\")   Wt 83.5 kg (184 lb)   SpO2 94%   BMI 31.22 kg/m²       Physical Examination:   General: Awake, alert, appears stated age   Head/eyes/ears: NCAT, EOMI, PERRL, TM WNL, no cerumen  Throat: moist mucus membranes, no pharyngeal erythema  Neck: Supple, nontender, no lymphadenopathy, thyroid exam unremarkable   Breast exam: deferred  Heart: RRR, no murmurs, rubs or gallops  Lungs: CTA bilaterally, no rhonchi rales or wheezes   Abdomen: Soft, NT/ND  Extremities: No edema, 2+ DP pulses   Skin: No concerning skin lesions on visualized skin   Neuro: AAO x 3, no FND, gait unremarkable    Assessment/Plan         Assessment & Plan  Encounter for preventative adult health care examination  Adult Health Examination  Age appropriate screening performed   Healthy lifestyle reviewed.   Depression screen negative  No additional pertinent family history or toxic habits   No high risk sexual behavior, declines STI screening  Cancer screening:   - Colonoscopy 5/24: tubular adenoma repeat 5 years   - Mammogram 2/24 ordered for repeat in February   - Pap smear 7/24: pap negative + HPV  - Skin -as above  Immunizations   - Due: Hep B? Screen for immunity   - UTD: flu, covid, shingrix,  Dental  UTD and visual examinations due    Discussed adequate Vitamin D intake     Orders:    CBC and Auto Differential; Future    Comprehensive Metabolic Panel; Future    Hemoglobin A1C; Future    Lipid Panel; Future    TSH with reflex to Free T4 if abnormal; Future    Lipid Panel; Future    Screening mammogram for breast cancer  Repeat mammogram in February       Hypertension, unspecified type  On lisinopril 20 " mg well-controlled  Can continue periodic blood pressure monitoring  Orders:    Albumin-Creatinine Ratio, Urine Random; Future    Hypercholesterolemia  CAC 0 in 2021, LPA quite high recommend consideration for medical management  Amenable to initiation of rosuvastatin 5 mg.  Potential side effects and management expectations reviewed including GI disturbance, myalgia and fatigue.  Rare but significant risks include hepatotoxicity, hyperglycemia and myopathy.  Repeat levels in 3 months to see if numbers improve  Orders:    rosuvastatin (Crestor) 5 mg tablet; Take 1 tablet (5 mg) by mouth once daily at bedtime.    Follow Up In [facility] - PCP - Health Maintenance; Future    Atypical ductal hyperplasia of breast  Recommended follow-up with breast clinic in the past, mammogram up-to-date         Malignant melanoma of left upper extremity including shoulder (Multi)  Status post excision with clean margins, has adequate follow-up with dermatology recommended routine skin checks         Immunity status testing    Orders:    Hepatitis B Surface Antigen; Future    Hepatitis B Surface Antibody; Future    Snoring  Without clinical concern for MARCELA, declines HSAT at present.        Follow-up 1 year

## 2024-11-19 NOTE — ASSESSMENT & PLAN NOTE
Status post excision with clean margins, has adequate follow-up with dermatology recommended routine skin checks

## 2024-11-19 NOTE — ASSESSMENT & PLAN NOTE
On lisinopril 20 mg well-controlled  Can continue periodic blood pressure monitoring  Orders:    Albumin-Creatinine Ratio, Urine Random; Future

## 2024-11-19 NOTE — PATIENT INSTRUCTIONS
Onelia,   Labs including bloodwork and/or urine is ordered today. The lab can be found on this floor (2nd floor) next to the pharmacy across from the elevators.  b  I recommend you monitor your home blood pressure readings. To do this, be sure that the blood pressure cuff is on bare skin. Feet should be planted on the floor and back should be supported. Arm should be resting at the level of the heart. No talking to anyone during measurement and no caffeine within 30 minutes of checking blood pressure.   Goal should be <130/80 on AVERAGE (outliers do not count).   Mammogram in February   Cholesterol   Start rosuvastatin 5mg at bedtime   Repeat cholesterol numbers in 3ish months to see if numbers improve.   Followup 1 year

## 2024-11-20 LAB
CREAT UR-MCNC: 154.9 MG/DL (ref 20–320)
MICROALBUMIN UR-MCNC: <7 MG/L
MICROALBUMIN/CREAT UR: NORMAL MG/G{CREAT}

## 2025-01-27 DIAGNOSIS — I10 HYPERTENSION, UNSPECIFIED TYPE: ICD-10-CM

## 2025-01-27 RX ORDER — LISINOPRIL 20 MG/1
20 TABLET ORAL DAILY
Qty: 90 TABLET | Refills: 3 | Status: SHIPPED | OUTPATIENT
Start: 2025-01-27 | End: 2026-01-27

## 2025-01-28 DIAGNOSIS — C43.9 MELANOMA OF SKIN (MULTI): ICD-10-CM

## 2025-01-28 NOTE — TUMOR BOARD NOTE
General Patient Information  Name:  Onelia Mejía  Evaluation #:  2  Conference Date:  9/16/2024  YOB: 1965  MRN:  [id number]  Program Physician(s):  Shorty Colbert  Referring Physician(s):  Hiren Bear      Summary   Stage:  IA ([id number]) ; Melanoma 5 year survival: 99%    Assessment:  Left posterior shoulder with a non-ulcerated superficial spreading melanoma, Breslow 0.2 mm. S/p WLE with 1 cm margins. Adequately surgically treated.    Recommendation:  Annual H&P.    Review Multidisciplinary Cutaneous Oncology Conference recommendation with patient.  Continue routine follow up and total body skin exams with Mimi Dominguez.    Follow Up:  Mimi Dominguez      History and Physical Exam  Dermatologic History:   59 y.o. female with a biopsy of the left posterior shoulder on 9/5/2024 showing a non-ulcerated melanoma, superficial spreading type, Breslow: 0.2 mm. She underwent a WLE with 1 cm margins on 10/14/2024 which showed changes consistent with previous procedure, inked margins free in planes of sections examined without residual atypical melanocytic neoplasm seen.        Past Medical History:    Past Medical History:   Diagnosis Date    Atypical ductal hyperplasia, breast     Hypertension     Other conditions influencing health status     No significant past medical history    Scoliosis        Family History of DNS/MM:  Unknown    Skin:  13x8 mm brown irregular patch with 2 different shades of brown and irregular broken pigment pattern     Lymphatic:        Pathology  Dermatopathology- DERM LAB: [id number]  Order: [id number]   Collected 10/14/2024 13:40       Status: Final result       Visible to patient: Yes (seen)       Dx: Melanoma of shoulder, left (Multi)    1 Result Note       1 Patient Communication      Component    FINAL DIAGNOSIS   SKIN, LEFT SHOULDER - POSTERIOR, EXCISION:  CHANGES CONSISTENT WITH PREVIOUS PROCEDURE, INKED MARGINS FREE IN PLANES OF SECTIONS EXAMINED,  WITHOUT RESIDUAL ATYPICAL MELANOCYTIC NEOPLASM SEEN.     ** Electronically signed out by Vishnu Quispe MD *            Dermatopathology- DERM LAB: [id number]  Collected 9/5/2024 13:29      SKIN, LEFT SHOULDER - POSTERIOR, SHAVE EXCISION:  MALIGNANT MELANOMA, BRESLOW THICKNESS 0.2 MM, PRESENT ON THE DEEP AND PERIPHERAL MARGIN, SEE NOTE.     Note: Microscopic examination reveals a specimen that extends into the superficial dermis. There is an asymmetric proliferation of nested and single atypical melanocytes throughout all layers of the epidermis. There are occasional nests of melanocytes in the dermis. The melanocytes stain with antibodies against PRAME and SOX-10. All control slides stain appropriately. The melanocytes have mildly enlarged nuclei with mild cytoplasm.     ** Electronically signed out by Vishnu Quispe MD **  SPECIMEN   Procedure  Biopsy, shave   Specimen Laterality  Left   TUMOR   Tumor Site  Skin of upper limb and shoulder: Left shoulder - posterior        Histologic Type  Superficial spreading melanoma (low-cumulative sun damage (CSD) melanoma)   Maximum Tumor (Breslow) Thickness (Millimeters)  0.2 mm   Ulceration  Not identified   Anatomic (Jerrod) Level  II (melanoma present in but does not fill and expand papillary dermis)   Mitotic Rate  None identified   Microsatellite(s)  Not identified   Lymphovascular Invasion  Not identified   Neurotropism  Not identified   Tumor-Infiltrating Lymphocytes  Not identified   Tumor Regression  Not identified   MARGINS     Margin Status for Invasive Melanoma  Invasive melanoma present at margin   Margin(s) Involved by Invasive Melanoma  Peripheral     Deep   Margin Status for Melanoma in situ  All margins negative for melanoma in situ   PATHOLOGIC STAGE CLASSIFICATION (pTNM, AJCC 8th Edition)     pT Category  pT1a

## 2025-02-03 ENCOUNTER — TUMOR BOARD CONFERENCE (OUTPATIENT)
Dept: HEMATOLOGY/ONCOLOGY | Facility: HOSPITAL | Age: 60
End: 2025-02-03
Payer: COMMERCIAL

## 2025-02-03 PROCEDURE — RXMED WILLOW AMBULATORY MEDICATION CHARGE

## 2025-02-06 ENCOUNTER — PHARMACY VISIT (OUTPATIENT)
Dept: PHARMACY | Facility: CLINIC | Age: 60
End: 2025-02-06
Payer: MEDICARE

## 2025-04-09 ASSESSMENT — PATIENT GLOBAL ASSESSMENT (PGA): WHAT IS THE PGA: PATIENT GLOBAL ASSESSMENT:  1 - CLEAR

## 2025-04-09 ASSESSMENT — DERMATOLOGY QUALITY OF LIFE (QOL) ASSESSMENT
RATE HOW EMOTIONALLY BOTHERED YOU ARE BY YOUR SKIN PROBLEM (FOR EXAMPLE, WORRY, EMBARRASSMENT, FRUSTRATION): 0 - NEVER BOTHERED
DATE THE QUALITY-OF-LIFE ASSESSMENT WAS COMPLETED: 67273
WHAT SINGLE SKIN CONDITION LISTED BELOW IS THE PATIENT ANSWERING THE QUALITY-OF-LIFE ASSESSMENT QUESTIONS ABOUT: NONE OF THE ABOVE
RATE HOW BOTHERED YOU ARE BY SYMPTOMS OF YOUR SKIN PROBLEM (EG, ITCHING, STINGING BURNING, HURTING OR SKIN IRRITATION): 0 - NEVER BOTHERED
WHAT SINGLE SKIN CONDITION LISTED BELOW IS THE PATIENT ANSWERING THE QUALITY-OF-LIFE ASSESSMENT QUESTIONS ABOUT: NONE OF THE ABOVE
RATE HOW BOTHERED YOU ARE BY SYMPTOMS OF YOUR SKIN PROBLEM (EG, ITCHING, STINGING BURNING, HURTING OR SKIN IRRITATION): 0 - NEVER BOTHERED
RATE HOW BOTHERED YOU ARE BY EFFECTS OF YOUR SKIN PROBLEMS ON YOUR ACTIVITIES (EG, GOING OUT, ACCOMPLISHING WHAT YOU WANT, WORK ACTIVITIES OR YOUR RELATIONSHIPS WITH OTHERS): 0 - NEVER BOTHERED
RATE HOW EMOTIONALLY BOTHERED YOU ARE BY YOUR SKIN PROBLEM (FOR EXAMPLE, WORRY, EMBARRASSMENT, FRUSTRATION): 0 - NEVER BOTHERED
RATE HOW BOTHERED YOU ARE BY EFFECTS OF YOUR SKIN PROBLEMS ON YOUR ACTIVITIES (EG, GOING OUT, ACCOMPLISHING WHAT YOU WANT, WORK ACTIVITIES OR YOUR RELATIONSHIPS WITH OTHERS): 0 - NEVER BOTHERED

## 2025-04-10 ENCOUNTER — APPOINTMENT (OUTPATIENT)
Dept: DERMATOLOGY | Facility: CLINIC | Age: 60
End: 2025-04-10
Payer: COMMERCIAL

## 2025-04-10 DIAGNOSIS — D22.9 MULTIPLE BENIGN NEVI: Primary | ICD-10-CM

## 2025-04-10 DIAGNOSIS — D22.9 FIBROUS PAPULE OF SKIN: ICD-10-CM

## 2025-04-10 DIAGNOSIS — L57.8 ACTINIC SKIN DAMAGE: ICD-10-CM

## 2025-04-10 DIAGNOSIS — Z85.820 PERSONAL HISTORY OF MALIGNANT MELANOMA OF SKIN: ICD-10-CM

## 2025-04-10 DIAGNOSIS — Z12.83 SCREENING EXAM FOR SKIN CANCER: ICD-10-CM

## 2025-04-10 DIAGNOSIS — L82.1 SEBORRHEIC KERATOSIS: ICD-10-CM

## 2025-04-10 DIAGNOSIS — L81.4 LENTIGO: ICD-10-CM

## 2025-04-10 PROCEDURE — 1036F TOBACCO NON-USER: CPT | Performed by: DERMATOLOGY

## 2025-04-10 PROCEDURE — 99213 OFFICE O/P EST LOW 20 MIN: CPT | Performed by: DERMATOLOGY

## 2025-04-10 ASSESSMENT — DERMATOLOGY PATIENT ASSESSMENT
DO YOU HAVE IRREGULAR MENSTRUAL CYCLES: NO
ARE YOU TRYING TO GET PREGNANT: NO
ARE YOU AN ORGAN TRANSPLANT RECIPIENT: NO
HAVE YOU HAD OR DO YOU HAVE VASCULAR DISEASE: NO
DO YOU USE SUNSCREEN: OCCASIONALLY
DO YOU HAVE ANY NEW OR CHANGING LESIONS: YES
HAVE YOU HAD OR DO YOU HAVE A STAPH INFECTION: NO
DO YOU HAVE ANY NEW OR CHANGING LESIONS: YES
ARE YOU ON BIRTH CONTROL: NO

## 2025-04-10 ASSESSMENT — DERMATOLOGY QUALITY OF LIFE (QOL) ASSESSMENT
DATE THE QUALITY-OF-LIFE ASSESSMENT WAS COMPLETED: 67305
RATE HOW EMOTIONALLY BOTHERED YOU ARE BY YOUR SKIN PROBLEM (FOR EXAMPLE, WORRY, EMBARRASSMENT, FRUSTRATION): 0 - NEVER BOTHERED
RATE HOW BOTHERED YOU ARE BY EFFECTS OF YOUR SKIN PROBLEMS ON YOUR ACTIVITIES (EG, GOING OUT, ACCOMPLISHING WHAT YOU WANT, WORK ACTIVITIES OR YOUR RELATIONSHIPS WITH OTHERS): 0 - NEVER BOTHERED
WHAT SINGLE SKIN CONDITION LISTED BELOW IS THE PATIENT ANSWERING THE QUALITY-OF-LIFE ASSESSMENT QUESTIONS ABOUT: NONE OF THE ABOVE
RATE HOW BOTHERED YOU ARE BY SYMPTOMS OF YOUR SKIN PROBLEM (EG, ITCHING, STINGING BURNING, HURTING OR SKIN IRRITATION): 0 - NEVER BOTHERED
ARE THERE EXCLUSIONS OR EXCEPTIONS FOR THE QUALITY OF LIFE ASSESSMENT: NO

## 2025-04-10 ASSESSMENT — ITCH NUMERIC RATING SCALE: HOW SEVERE IS YOUR ITCHING?: 0

## 2025-04-10 ASSESSMENT — PATIENT GLOBAL ASSESSMENT (PGA): PATIENT GLOBAL ASSESSMENT: PATIENT GLOBAL ASSESSMENT:  1 - CLEAR

## 2025-04-10 NOTE — PATIENT INSTRUCTIONS
Please make sure you schedule an annual eye exam   Continue with your regular dental and gyne exams    All first degree relatives (children, siblings, parents) should schedule with a dermatologist

## 2025-04-10 NOTE — PROGRESS NOTES
Subjective     Onelia Mejía is a 60 y.o. female who presents for the following: Skin Check (Tip of nose &  Bilateral  arms &  hands). Last derm visit 10/28/24 with Dr. Cavazos for wound check after excision for melanoma on left shoulder 10/14/24 that was biopsied 9/5/24 during Full Skin Exam as a 0.2 breslow melanoma    Review of Systems:  No other skin or systemic complaints other than what is documented elsewhere in the note.    The following portions of the chart were reviewed this encounter and updated as appropriate:  Tobacco  Allergies  Meds  Problems  Med Hx  Surg Hx         Skin Cancer History  Biopsy Date Type Location Status   9/5/24 Melanoma Left Shoulder - Posterior Patient/Caregiver Informed  10/14/24       Specialty Problems          Dermatology Problems    Hirsutism    Malignant melanoma of left upper extremity including shoulder        Objective   Well appearing patient in no apparent distress; mood and affect are within normal limits.    A full examination was performed including scalp, head, eyes, ears, nose, lips, neck, chest, axillae, abdomen, back, buttocks, bilateral upper extremities, bilateral lower extremities, hands, feet, fingers, toes, fingernails, and toenails. All findings within normal limits unless otherwise noted below.    Assessment/Plan   1. Multiple benign nevi  Brown and tan macules and papules with reassuring findings on dermoscopy    -These lesions have benign, reassuring patterns on dermoscopy  -Recommend continued self observation, and to contact the office if any changes in nevi are noticed    2. Screening exam for skin cancer  As part of a routine Full Skin Exam, a genital examination with the presence of a chaperone was offered. The patient declinedd the exam and declined the chaperone.       Full body skin exam  -No lesions concerning for malignancy on the remainder the skin exam today   - The ugly duckling sign was discussed. Monitor for any skin lesions that are  different in color, shape, or size than others on body  -Sun protection was discussed. Recommend SPF 30+, hats with brims, sun protective clothing, and avoiding sun exposure between 10 AM and 2 PM whenever possible  -Recommend regular skin exams or sooner if new or changing lesions       Related Procedures  Follow Up In Dermatology - Established Patient  Follow Up In Dermatology - Established Patient    3. Lentigo  Tan macules    -Benign appearing on exam  -Reassurance, recommend observation    4. Seborrheic keratosis  Stuck on, waxy macule(s)/papule(s)/plaque(s) with comedo-like openings and milia like cysts    -Discussed the nature of the diagnosis  -Reassurance, recommend continued observation    5. Fibrous papule of skin  Mid Tip of Nose    -Discussed the nature of the diagnosis  -Reassurance, recommend continued observation    6. Actinic skin damage  Background of photodamage with hyper- and hypo-pigmented macules on the skin    - actinic keratosis on right tip of nose treated with liquid nitrogen 9/5/24 resolved    7. Personal history of malignant melanoma of skin  Lymph node basins palpated in relevant regions without appreciable adenopathy; regions include the neck and/or supraclavicular and/or axillary and/or inguinal and/or popliteal skin.    - Stage IA melanoma: excision for melanoma on left shoulder 10/14/24 that was biopsied 9/5/24 during Full Skin Exam as a 0.2 breslow melanoma    Personal History of Malignant Melanoma  -Well healed scar(s) with no evidence of recurrence  -Discussed the need for regular full skin examinations in the office, and monthly self examinations at home  -Discussed the need for annual ophthalmology exams with dilation  -Discussed concerning lesions and when to return sooner if any changes noted in skin lesions. Patient verbalizes understanding.        Follow up 6 months Full Skin Exam

## 2025-05-01 PROCEDURE — RXMED WILLOW AMBULATORY MEDICATION CHARGE

## 2025-05-02 ENCOUNTER — PHARMACY VISIT (OUTPATIENT)
Dept: PHARMACY | Facility: CLINIC | Age: 60
End: 2025-05-02
Payer: MEDICARE

## 2025-05-07 DIAGNOSIS — E78.00 HYPERCHOLESTEROLEMIA: ICD-10-CM

## 2025-05-07 DIAGNOSIS — Z00.00 ENCOUNTER FOR PREVENTATIVE ADULT HEALTH CARE EXAMINATION: Primary | ICD-10-CM

## 2025-05-07 RX ORDER — ROSUVASTATIN CALCIUM 5 MG/1
5 TABLET, COATED ORAL NIGHTLY
Qty: 90 TABLET | Refills: 1 | Status: SHIPPED | OUTPATIENT
Start: 2025-05-07 | End: 2026-05-07

## 2025-08-05 ENCOUNTER — APPOINTMENT (OUTPATIENT)
Dept: OBSTETRICS AND GYNECOLOGY | Facility: CLINIC | Age: 60
End: 2025-08-05
Payer: COMMERCIAL

## 2025-08-05 VITALS
HEIGHT: 65 IN | BODY MASS INDEX: 31.49 KG/M2 | SYSTOLIC BLOOD PRESSURE: 123 MMHG | HEART RATE: 80 BPM | DIASTOLIC BLOOD PRESSURE: 87 MMHG | WEIGHT: 189 LBS

## 2025-08-05 DIAGNOSIS — Z01.419 ENCOUNTER FOR GYNECOLOGICAL EXAMINATION WITHOUT ABNORMAL FINDING: Primary | ICD-10-CM

## 2025-08-05 PROCEDURE — 99396 PREV VISIT EST AGE 40-64: CPT | Performed by: OBSTETRICS & GYNECOLOGY

## 2025-08-05 PROCEDURE — 3008F BODY MASS INDEX DOCD: CPT | Performed by: OBSTETRICS & GYNECOLOGY

## 2025-08-05 PROCEDURE — 3074F SYST BP LT 130 MM HG: CPT | Performed by: OBSTETRICS & GYNECOLOGY

## 2025-08-05 PROCEDURE — 3079F DIAST BP 80-89 MM HG: CPT | Performed by: OBSTETRICS & GYNECOLOGY

## 2025-08-05 ASSESSMENT — ENCOUNTER SYMPTOMS
DEPRESSION: 0
OCCASIONAL FEELINGS OF UNSTEADINESS: 0
LOSS OF SENSATION IN FEET: 0

## 2025-08-05 ASSESSMENT — PATIENT HEALTH QUESTIONNAIRE - PHQ9
2. FEELING DOWN, DEPRESSED OR HOPELESS: NOT AT ALL
SUM OF ALL RESPONSES TO PHQ9 QUESTIONS 1 AND 2: 0
1. LITTLE INTEREST OR PLEASURE IN DOING THINGS: NOT AT ALL

## 2025-08-05 ASSESSMENT — COLUMBIA-SUICIDE SEVERITY RATING SCALE - C-SSRS
1. IN THE PAST MONTH, HAVE YOU WISHED YOU WERE DEAD OR WISHED YOU COULD GO TO SLEEP AND NOT WAKE UP?: NO
2. HAVE YOU ACTUALLY HAD ANY THOUGHTS OF KILLING YOURSELF?: NO
6. HAVE YOU EVER DONE ANYTHING, STARTED TO DO ANYTHING, OR PREPARED TO DO ANYTHING TO END YOUR LIFE?: NO

## 2025-08-05 NOTE — PROGRESS NOTES
Subjective   Onelia Mejía is a 60 y.o. female here for a routine exam.  There is no postmenopausal bleeding or discharge.  No dysuria or change in bowel habits.  There is occasional stress incontinence.  She is current on her colonoscopy.    She had an IUD removed 2024, and no bleeding.  FSH is in the menopausal range (80.5).  Personal health questionnaire reviewed: yes.     Gynecologic History  No LMP recorded. Patient is postmenopausal.  Contraception: post menopausal status  Last Pap: 24. Results were: normal  Last mammogram: 24. Results were: normal    Obstetric History  OB History    Para Term  AB Living   4 4 1 3 0 4   SAB IAB Ectopic Multiple Live Births   0 0 0 3 4      # Outcome Date GA Lbr Kimo/2nd Weight Sex Type Anes PTL Lv   4             3             2             1 Term                Objective   Constitutional: Alert and in no acute distress. Well developed, well nourished.   Head and Face: Head and face: Normal.    Eyes: Normal external exam - nonicteric sclera, extraocular movements intact (EOMI) and no ptosis.   Neck: No neck asymmetry. Supple. Thyroid not enlarged and there were no palpable thyroid nodules.    Pulmonary: No respiratory distress.   Chest: Breasts: Normal appearance, no nipple discharge and no skin changes. Palpation of breasts and axillae: No palpable mass and no axillary lymphadenopathy.   Abdomen: Soft nontender; no abdominal mass palpated. No organomegaly. No hernias.   Genitourinary: External genitalia: Normal. No inguinal lymphadenopathy. Bartholin's Urethral and Skenes Glands: Normal. Urethra: Normal.  Bladder: Normal on palpation. Vagina: Normal. Cervix: Normal.  Uterus: Normal.  Right Adnexa/parametria: Normal.  Left Adnexa/parametria: Normal.  Inspection of Perianal Area: Normal.   Musculoskeletal: No joint swelling seen, normal movements of all extremities.   Skin: Normal skin color and pigmentation, normal skin  turgor, and no rash.   Neurologic: Non-focal. Grossly intact.   Psychiatric: Alert and oriented x 3. Affect normal to patient baseline. Mood: Appropriate.  Physical Exam     Assessment/Plan   Healthy female exam.  This is a 60-year-old female with a normal exam.  No Pap smear was sent, she is high risk HPV negative in 2024.    Her routine mammogram was ordered with tomosynthesis.    I will see her in 1 year.  Education reviewed: self breast exams.  Mammogram ordered.

## 2025-08-06 LAB
CHOLEST SERPL-MCNC: 141 MG/DL
CHOLEST/HDLC SERPL: 2.7 (CALC)
HDLC SERPL-MCNC: 52 MG/DL
LDLC SERPL CALC-MCNC: 66 MG/DL (CALC)
NONHDLC SERPL-MCNC: 89 MG/DL (CALC)
TRIGL SERPL-MCNC: 142 MG/DL

## 2025-11-11 ENCOUNTER — APPOINTMENT (OUTPATIENT)
Dept: DERMATOLOGY | Facility: CLINIC | Age: 60
End: 2025-11-11
Payer: COMMERCIAL

## 2025-11-19 ENCOUNTER — APPOINTMENT (OUTPATIENT)
Dept: PRIMARY CARE | Facility: CLINIC | Age: 60
End: 2025-11-19
Payer: COMMERCIAL

## 2026-08-11 ENCOUNTER — APPOINTMENT (OUTPATIENT)
Facility: CLINIC | Age: 61
End: 2026-08-11
Payer: COMMERCIAL